# Patient Record
Sex: MALE | Race: WHITE | NOT HISPANIC OR LATINO | Employment: UNEMPLOYED | URBAN - METROPOLITAN AREA
[De-identification: names, ages, dates, MRNs, and addresses within clinical notes are randomized per-mention and may not be internally consistent; named-entity substitution may affect disease eponyms.]

---

## 2018-01-09 NOTE — PROGRESS NOTES
Assessment    1  Dislocation of proximal interphalangeal joint of finger, initial encounter (834 02)   (L44 740D)    Plan  Dislocation of proximal interphalangeal joint of finger, initial encounter    · Weight bearing as tolerated ; Status:Complete;   Done: [id number]   · Follow Up After Surgery Evaluation and Treatment  Follow-up  Status: Hold For -  Scheduling  Requested for: [id number]   · Schedule Surgery Treatment  Procedure  Status: Hold For - Scheduling  Requested for:  [id number]    Discussion/Summary    We have scheduled the patient for closed vs open reduction of the right small PIP joint  We will get this scheduled at his convenience  We did discuss that we may have to pin the PIP joint, and that he will most likely have stiffness of this finger, most likely permanently  We will see him back as an outpatient after surgery  Chief Complaint  Right small finger PIP dislocation      History of Present Illness  HPI: 44year old male presents for evaluation of his right small finger  He has had multiple episodes of "passing out" recently which are being worked up by his primary care provider  Two months ago, he passed out and woke with right small finger pain at the PIP joint  He did not seek immediate treatment  On 12/30/2015, he had an xray obtained which demonstrated a dorsal dislocation of the PIP joint of the right small finger  He presents today for evaluation  Active Problems    1  Calf pain, left (729 5) (M79 662)   2  Closed nondisplaced fracture of lateral malleolus of left fibula, initial encounter (824 2)   (S82 65XA)   3  Left foot pain (729 5) (K80 401)    Family History    · Family history of arthritis (V17 7) (Z82 61)    Social History    · Former smoker (V15 82) (Y19 049)    Current Meds   1  No Reported Medications Recorded    Allergies    1  No Known Drug Allergies    Physical Exam       Right Basic: (Right small finger: tenderness to palpation at the PIP joint   Limited range of motion of the PIP joint  Finger shortened as compared to the contralateral side  Sensation intact except for volar ulnar aspect which had previous crush injury  Minor hook nail deformity due to old crush injury)       Results/Data  I personally reviewed the films/images/results in the office today  My interpretation follows  X-ray Review Plain films of the right hand reveal right small finger PIP dislocation  Attending Note  Attending Note [facility]: Attending Note: I interviewed, took the history and examined the patient and I agree with the Resident management plan as it was presented to me  Level of Participation: I was present in clinic and examined the patient  Comments/Additional Findings: Dimitri Franco has a chronic dorsal PIP joint dislocation  Given the chronicity I recommended surgical reduction  This will likely require an over reduction and possibly pinning of the joint  I explained the risks and benefits of surgery including stiffness  I agree with the Resident's note  Future Appointments    Date/Time Provider Specialty Site   02/08/2016 08:00 AM ELIZABETH Smith  [address] [place] [address] OR   02/22/2016 03:50 PM ELIZABETH Smith   Orthopedic [address] [place] [address]     Signatures   Electronically signed by : ELIZABETH Mejia ; Jan 25 2016  4:13PM EST                       (Author)    Electronically signed by : ELIZABETH Andre ; Jan 25 2016  4:57PM EST                       (Author)

## 2018-01-11 NOTE — CONSULTS
Chief Complaint  Chief Complaint Free Text Note Form: Right small finger PIP dislocation      History of Present Illness  HPI: 44year old male presents for evaluation of his right small finger  He has had multiple episodes of "passing out" recently which are being worked up by his primary care provider  Two months ago, he passed out and woke with right small finger pain at the PIP joint  He did not seek immediate treatment  On 12/30/2015, he had an xray obtained which demonstrated a dorsal dislocation of the PIP joint of the right small finger  He presents today for evaluation  Active Problems    1  Calf pain, left (729 5) (M79 662)   2  Closed nondisplaced fracture of lateral malleolus of left fibula, initial encounter (824 2)   (S82 65XA)   3  Left foot pain (729 5) (S37 156)    Family History    1  Family history of arthritis (V17 7) (Z82 61)    Social History    · Former smoker (V08 31) (N71 884)    Current Meds   1  No Reported Medications Recorded    Allergies    1  No Known Drug Allergies    Physical Exam       Right Basic: (Right small finger: tenderness to palpation at the PIP joint  Limited range of motion of the PIP joint  Finger shortened as compared to the contralateral side  Sensation intact except for volar ulnar aspect which had previous crush injury  Minor hook nail deformity due to old crush injury)       Results/Data  Diagnostic Studies Reviewed: I personally reviewed the films/images/results in the office today  My interpretation follows  X-ray Review Plain films of the right hand reveal right small finger PIP dislocation  Assessment    1  Dislocation of proximal interphalangeal joint of finger, initial encounter (834 02)   (D94 176H)    Plan  Dislocation of proximal interphalangeal joint of finger, initial encounter    1  Weight bearing as tolerated ; Status:Complete;   Done: [id number]   2   Follow Up After Surgery Evaluation and Treatment  Follow-up  Status: Hold For - Scheduling  Requested for: 18VXQ5636   3  Schedule Surgery Treatment  Procedure  Status: Hold For - Scheduling  Requested for:   [id number]    Discussion/Summary  Discussion Summary:   We have scheduled the patient for closed vs open reduction of the right small PIP joint  We will get this scheduled at his convenience  We did discuss that we may have to pin the PIP joint, and that he will most likely have stiffness of this finger, most likely permanently  We will see him back as an outpatient after surgery        Future Appointments    Signatures   Electronically signed by : ELIZABETH Whelan ; Jan 25 2016  4:13PM EST                       (Author)    Electronically signed by : ELIZABETH Cha ; Jan 25 2016  4:57PM EST                       (Author)

## 2018-01-11 NOTE — CONSULTS
Chief Complaint  Right small finger PIP dislocation      History of Present Illness  HPI: 44year old male presents for evaluation of his right small finger  He has had multiple episodes of "passing out" recently which are being worked up by his primary care provider  Two months ago, he passed out and woke with right small finger pain at the PIP joint  He did not seek immediate treatment  On 12/30/2015, he had an xray obtained which demonstrated a dorsal dislocation of the PIP joint of the right small finger  He presents today for evaluation  Active Problems    1  Calf pain, left (729 5) (M79 662)   2  Closed nondisplaced fracture of lateral malleolus of left fibula, initial encounter (824 2)   (S82 65XA)   3  Left foot pain (729 5) (V07 842)    Family History    · Family history of arthritis (V17 7) (Z82 61)    Social History    · Former smoker (V15 82) (T51 991)    Current Meds   1  No Reported Medications Recorded    Allergies    1  No Known Drug Allergies    Physical Exam       Right Basic: (Right small finger: tenderness to palpation at the PIP joint  Limited range of motion of the PIP joint  Finger shortened as compared to the contralateral side  Sensation intact except for volar ulnar aspect which had previous crush injury  Minor hook nail deformity due to old crush injury)       Results/Data  I personally reviewed the films/images/results in the office today  My interpretation follows  X-ray Review Plain films of the right hand reveal right small finger PIP dislocation  Assessment    1   Dislocation of proximal interphalangeal joint of finger, initial encounter (834 02)   (J04 754Y)    Plan  Dislocation of proximal interphalangeal joint of finger, initial encounter    · Weight bearing as tolerated ; Status:Complete;   Done: 48KTO6964   · Follow Up After Surgery Evaluation and Treatment  Follow-up  Status: Hold For -  Scheduling  Requested for: 69THP8565   · Schedule Surgery Treatment  Procedure Status: Hold For - Scheduling  Requested for:  [id number]    Discussion/Summary    We have scheduled the patient for closed vs open reduction of the right small PIP joint  We will get this scheduled at his convenience  We did discuss that we may have to pin the PIP joint, and that he will most likely have stiffness of this finger, most likely permanently  We will see him back as an outpatient after surgery        Signatures   Electronically signed by : ELIZABETH Rudd ; Jan 25 2016  4:13PM EST                       (Author)    Electronically signed by : ELIZABETH Pedersen ; Jan 25 2016  4:57PM EST                       (Author)

## 2022-04-05 ENCOUNTER — OFFICE VISIT (OUTPATIENT)
Dept: FAMILY MEDICINE CLINIC | Facility: CLINIC | Age: 57
End: 2022-04-05
Payer: COMMERCIAL

## 2022-04-05 VITALS
WEIGHT: 148 LBS | DIASTOLIC BLOOD PRESSURE: 80 MMHG | TEMPERATURE: 97.8 F | OXYGEN SATURATION: 99 % | BODY MASS INDEX: 21.19 KG/M2 | HEIGHT: 70 IN | HEART RATE: 72 BPM | SYSTOLIC BLOOD PRESSURE: 120 MMHG | RESPIRATION RATE: 16 BRPM

## 2022-04-05 DIAGNOSIS — Z00.00 WELL ADULT EXAM: Primary | ICD-10-CM

## 2022-04-05 DIAGNOSIS — Z13.89 SCREENING FOR CARDIOVASCULAR, RESPIRATORY, AND GENITOURINARY DISEASES: ICD-10-CM

## 2022-04-05 DIAGNOSIS — Z13.29 SCREENING FOR THYROID DISORDER: ICD-10-CM

## 2022-04-05 DIAGNOSIS — Z12.11 SCREENING FOR COLON CANCER: ICD-10-CM

## 2022-04-05 DIAGNOSIS — Z11.59 NEED FOR HEPATITIS C SCREENING TEST: ICD-10-CM

## 2022-04-05 DIAGNOSIS — Z12.5 SCREENING FOR PROSTATE CANCER: ICD-10-CM

## 2022-04-05 DIAGNOSIS — Z11.4 SCREENING FOR HIV (HUMAN IMMUNODEFICIENCY VIRUS): ICD-10-CM

## 2022-04-05 DIAGNOSIS — F10.10 ALCOHOL ABUSE: ICD-10-CM

## 2022-04-05 DIAGNOSIS — Z13.6 SCREENING FOR CARDIOVASCULAR, RESPIRATORY, AND GENITOURINARY DISEASES: ICD-10-CM

## 2022-04-05 DIAGNOSIS — Z23 NEED FOR VACCINATION: ICD-10-CM

## 2022-04-05 DIAGNOSIS — Z13.83 SCREENING FOR CARDIOVASCULAR, RESPIRATORY, AND GENITOURINARY DISEASES: ICD-10-CM

## 2022-04-05 PROCEDURE — 99386 PREV VISIT NEW AGE 40-64: CPT | Performed by: FAMILY MEDICINE

## 2022-04-05 PROCEDURE — 90471 IMMUNIZATION ADMIN: CPT

## 2022-04-05 PROCEDURE — 90715 TDAP VACCINE 7 YRS/> IM: CPT

## 2022-04-05 PROCEDURE — 3725F SCREEN DEPRESSION PERFORMED: CPT | Performed by: FAMILY MEDICINE

## 2022-04-05 NOTE — PROGRESS NOTES
FAMILY PRACTICE HEALTH MAINTENANCE OFFICE VISIT  [facility]    NAME: Catherine Wise  AGE: 60 y o  SEX: male  : 1965     DATE: 2022    Assessment and Plan     1  Well adult exam    2  Need for hepatitis C screening test  -     Hepatitis C Antibody ([facility]); Future  -     Hepatitis C Antibody ([facility])    3  Screening for HIV (human immunodeficiency virus)  -     [facility]- Human Immunodeficiency Virus 1/2 Antigen / Antibody ( Fourth Generation) with Reflex Testing; Future    4  Screening for cardiovascular, respiratory, and genitourinary diseases  -     CBC and differential; Future  -     Comprehensive metabolic panel; Future  -     Lipid Panel with Direct LDL reflex; Future  -     CBC and differential  -     Comprehensive metabolic panel  -     Lipid Panel with Direct LDL reflex    5  Screening for thyroid disorder  -     TSH, 3rd generation with Free T4 reflex; Future  -     TSH, 3rd generation with Free T4 reflex    6  Screening for prostate cancer  -     PSA, Total Screen; Future    7  Screening for colon cancer  -     Ambulatory referral for colonoscopy; Future    8  Need for vaccination  -     TDAP VACCINE GREATER THAN OR EQUAL TO 6YO IM    9  Alcohol abuse  Comments: On average drinks a 6 pack of beer and a couple of shots daily  Not ready to cut down  Counseled on cessation  · Patient Counseling:   · Nutrition: Stressed importance of a well balanced diet, moderation of sodium/saturated fat, caloric balance and sufficient intake of fiber  · Exercise: Stressed the importance of regular exercise with a goal of 150 minutes per week  · Dental Health: Discussed daily flossing and brushing and regular dental visits   · Immunizations reviewed: See Orders  · Discussed benefits of:  Colon Cancer Screening, Prostate Cancer Screening  and Screening labs   BMI Counseling: Body mass index is 21 54 kg/m²   Discussed with patient's BMI with him  The BMI is normal      No follow-ups on file  Chief Complaint     Chief Complaint   Patient presents with    Physical Exam     [facility] Patient Visit    Establish Care       History of Present Illness     HPI    Well Adult Physical   Patient here for a comprehensive physical exam       Diet and Physical Activity  Diet: well balanced diet  Exercise: never      Depression Screen  PHQ-2/9 Depression Screening    Little interest or pleasure in doing things: 0 - not at all  Feeling down, depressed, or hopeless: 0 - not at all  PHQ-2 Score: 0  PHQ-2 Interpretation: Negative depression screen          General Health  Hearing: Normal:  bilateral  Vision: no vision problems, most recent eye exam >1 year and wears glasses  Dental: no dental visits for >1 year, brushes teeth once daily and does not floss    Reproductive Health  No issues       The following portions of the patient's history were reviewed and updated as appropriate: allergies, current medications, past family history, past medical history, past social history, past surgical history and problem list     Review of Systems     Review of Systems   Constitutional: Negative  HENT: Negative  Eyes: Negative  Respiratory: Negative  Cardiovascular: Negative  Gastrointestinal: Negative  Endocrine: Negative  Genitourinary: Negative  Musculoskeletal: Negative  Neurological: Negative  Hematological: Negative  Psychiatric/Behavioral: Negative          Past Medical History     Past Medical History:   Diagnosis Date    COPD [facility]chronic obstructive pulmonary disease) ([facility] [address] )     Hx of TIA (transient ischemic attack) and stroke     Seizures ([facility])        Past Surgical History     Past Surgical History:   Procedure Laterality Date    NO PAST SURGERIES         Social History     Social History     Socioeconomic History    Marital status: Single     Spouse name: None    Number of children: None    Years of education: None    Highest education level: None   Occupational History    None   Tobacco Use    Smoking status: Former Smoker     Packs/day: 2 00     Years: 30 00     Pack years: 60 00     Types: Cigarettes    Smokeless tobacco: Never Used   Vaping Use    Vaping Use: Never used   Substance and Sexual Activity    Alcohol use: Yes     Comment: 1-2 x week    Drug use: No    Sexual activity: None   Other Topics Concern    None   Social History Narrative    None     Social Determinants of Health     Financial Resource Strain: Not on file   Food Insecurity: Not on file   Transportation Needs: Not on file   Physical Activity: Not on file   Stress: Not on file   Social Connections: Not on file   Intimate Partner Violence: Not on file   Housing Stability: Not on file       Family History     History reviewed  No pertinent family history  Current Medications     No current outpatient medications on file  Allergies     No Known Allergies    Objective     /80   Pulse 72   Temp 97 8 °F (36 6 °C)   Resp 16   Ht 5' 9 5" (1 765 m)   Wt 67 1 kg (148 lb)   SpO2 99%   BMI 21 54 kg/m²      Physical Exam  Constitutional:       General: He is not in acute distress  Appearance: He is well-developed  He is not diaphoretic  HENT:      Head: Normocephalic and atraumatic  Right Ear: Hearing, tympanic membrane, ear canal and external ear normal       Left Ear: Hearing, tympanic membrane, ear canal and external ear normal       Nose: Nose normal       Mouth/Throat:      Pharynx: No oropharyngeal exudate  Eyes:      General: No scleral icterus  Right eye: No discharge  Left eye: No discharge  Conjunctiva/sclera: Conjunctivae normal       Pupils: Pupils are equal, round, and reactive to light  Neck:      Thyroid: No thyromegaly  Trachea: No tracheal deviation  Cardiovascular:      Rate and Rhythm: Normal rate and regular rhythm  Heart sounds: Normal heart sounds   No murmur heard  No friction rub  No gallop  Pulmonary:      Effort: Pulmonary effort is normal  No respiratory distress  Breath sounds: Normal breath sounds  No wheezing or rales  Chest:      Chest wall: No tenderness  Abdominal:      General: Bowel sounds are normal  There is no distension  Palpations: Abdomen is soft  There is no mass  Tenderness: There is no abdominal tenderness  There is no guarding or rebound  Musculoskeletal:         General: No tenderness or deformity  Normal range of motion  Cervical back: Normal range of motion and neck supple  Skin:     General: Skin is warm and dry  Coloration: Skin is not pale  Findings: No erythema or rash  Neurological:      Mental Status: He is alert and oriented to person, place, and time  Motor: No abnormal muscle tone  Coordination: Coordination normal       Deep Tendon Reflexes: Reflexes normal    Psychiatric:         Behavior: Behavior normal          Thought Content:  Thought content normal          Judgment: Judgment normal             Visual Acuity Screening    Right eye Left eye Both eyes   Without correction:      With correction: 20/25 20/30 20/20           MD EV [facility]

## 2022-04-08 ENCOUNTER — OFFICE VISIT (OUTPATIENT)
Dept: GASTROENTEROLOGY | Facility: CLINIC | Age: 57
End: 2022-04-08
Payer: COMMERCIAL

## 2022-04-08 VITALS
HEIGHT: 70 IN | SYSTOLIC BLOOD PRESSURE: 130 MMHG | WEIGHT: 154.8 LBS | DIASTOLIC BLOOD PRESSURE: 82 MMHG | BODY MASS INDEX: 22.16 KG/M2 | TEMPERATURE: 98.7 F | HEART RATE: 81 BPM

## 2022-04-08 DIAGNOSIS — F10.10 ALCOHOL ABUSE: Primary | ICD-10-CM

## 2022-04-08 DIAGNOSIS — R19.8 LIVER PALPABLE: ICD-10-CM

## 2022-04-08 DIAGNOSIS — Z12.11 SCREENING FOR COLON CANCER: ICD-10-CM

## 2022-04-08 PROCEDURE — 1036F TOBACCO NON-USER: CPT | Performed by: PHYSICIAN ASSISTANT

## 2022-04-08 PROCEDURE — 3008F BODY MASS INDEX DOCD: CPT | Performed by: PHYSICIAN ASSISTANT

## 2022-04-08 PROCEDURE — 99204 OFFICE O/P NEW MOD 45 MIN: CPT | Performed by: PHYSICIAN ASSISTANT

## 2022-04-08 RX ORDER — SODIUM PICOSULFATE, MAGNESIUM OXIDE, AND ANHYDROUS CITRIC ACID 10; 3.5; 12 MG/160ML; G/160ML; G/160ML
LIQUID ORAL
Qty: 320 ML | Refills: 0 | Status: SHIPPED | OUTPATIENT
Start: 2022-04-08 | End: 2022-05-03 | Stop reason: ALTCHOICE

## 2022-04-08 NOTE — LETTER
April 12, 2022     Chelo Velez MD  [address] [address]    Patient: Lady Tiwari   YOB: 1965   Date of Visit: 4/8/2022       Dear Dr Delores Lewis:    Thank you for referring Ross Gregorypavan to me for evaluation  Below are my notes for this consultation  If you have questions, please do not hesitate to call me  I look forward to following your patient along with you  Sincerely,        Leidy Pat PA-C        CC: No Recipients  Petr Thornton  4/8/2022  4:24 PM  Attested  [facility] - Outpatient Consultation  Mike Wise 60 y o  male MRN: [id number]  Encounter: [id number]          ASSESSMENT AND PLAN:      1  Screening for colon cancer  No alarm symptoms at this time, rule out underlying adenomatous polyps or malignancy    -will schedule patient for colonoscopy    -procedure was explained in detail to the patient at this time including associated risks and benefits, risks including but not limited to infection, perforation bleeding    -prescription and instructions provided for colonoscopy prep      2   Alcohol abuse with longstanding history of heavy alcohol use, possibly palpable liver on abdominal exam, question for underlying undiagnosed chronic liver disease    - will check right upper quadrant ultrasound, along with CBC, CMP, and PT INR    -if this workup is suggestive of cirrhosis of the liver, then we can plan for EGD at the same time as colonoscopy; in such case would also recommend more extensive serologic workup at that time to exclude other causes of chronic liver disease, and to check AFP level    - I advised strongly about the importance of decreasing his alcohol intake, and about considering rehab, I told him in no uncertain terms that his current pattern of alcohol use this places him at high risk for development of cirrhosis and its associated complications    ______________________________________________________________________    HPI:  75-year-old male with history of alcohol abuse, COPD, TIA who presents for evaluation  Patient said he recently established care with a PCP in the area who recommended him to see us for colon cancer screening  The patient has never had colonoscopy or any form of colon cancer screening before  Denies any known family history of colon cancer  He denies any irregularities to stool habits, any blood or mucus in the stools, any abdominal pain, any nausea or vomiting, any loss of appetite or unexplained weight loss, any difficulty or discomfort with swallowing, any acid reflux or heartburn  The patient denies any known history of liver disease, any history of IV drug use, he says he does use marijuana, he quit smoking cigarettes, he does drink alcohol, estimates that he will drink about a 6 pack of beer and a couple shots of liquor a day, and this has been his pattern of alcohol use for a long time  Has tried to quit couple times in the past does not specific about how long he ever was able to maintain sobriety  I do not see any lab work or liver imaging on file from the patient within the last 5 years  He denies any lower extremity swelling, abdominal swelling, tremors or memory issues  REVIEW OF SYSTEMS:    CONSTITUTIONAL: Denies any fever, chills, rigors, and weight loss  HEENT: No earache or tinnitus  Denies hearing loss or visual disturbances  CARDIOVASCULAR: No chest pain or palpitations  RESPIRATORY: Denies any cough, hemoptysis, shortness of breath or dyspnea on exertion  GASTROINTESTINAL: As noted in the History of Present Illness  GENITOURINARY: No problems with urination  Denies any hematuria or dysuria  NEUROLOGIC: No dizziness or vertigo, denies headaches  MUSCULOSKELETAL: Denies any muscle or joint pain  SKIN: Denies skin rashes or itching     ENDOCRINE: Denies excessive thirst  Denies intolerance to heat or cold  PSYCHOSOCIAL: Denies depression or anxiety  Denies any recent memory loss  Historical Information   Past Medical History:   Diagnosis Date    [facility]ch[facility] disease) ([facility] [address] )     Hx of TIA (transient ischemic attack) and stroke     Seizures ([facility])      Past Surgical History:   Procedure Laterality Date    NO PAST SURGERIES       Social History   Social History     Substance and Sexual Activity   Alcohol Use Yes    Comment: 1-2 x week     Social History     Substance and Sexual Activity   Drug Use No     Social History     Tobacco Use   Smoking Status Former Smoker    Packs/day: 2 00    Years: 30 00    Pack years: 60 00    Types: Cigarettes   Smokeless Tobacco Never Used     History reviewed  No pertinent family history  Meds/Allergies       Current Outpatient Medications:     Sod Picosulfate-Mag Ox-Cit Acd (Clenpiq) 10-3 5-12 MG-GM -GM/160ML SOLN    No Known Allergies        Objective     Blood pressure 130/82, pulse 81, temperature 98 7 °F (37 1 °C), temperature source Temporal, height 5' 9 5" (1 765 m), weight 70 2 kg (154 lb 12 8 oz)  Body mass index is 22 53 kg/m²  PHYSICAL EXAM:      General Appearance:   Alert, cooperative, no distress  Fairly blunted affect, mildly tremulous, oriented x3 and answering questions appropriately, mild difficulty finding words   HEENT:   Normocephalic, atraumatic, anicteric      Neck:  Supple, symmetrical, trachea midline   Lungs:   Clear to auscultation bilaterally; no rales, rhonchi or wheezing; respirations unlabored    Heart[de-identified]   Regular rate and rhythm; no murmur, rub, or gallop     Abdomen:   Soft, non-tender, non-distended; normal bowel sounds; no masses, some firmness to palpation in the upper abdomen and particularly right upper quadrant, possibly palpable enlarged liver    Genitalia:   Deferred    Rectal:   Deferred    Extremities:  No cyanosis, clubbing or edema    Pulses:  2+ and symmetric    Skin:  No jaundice, rashes, or lesions    Lymph nodes:  No palpable cervical lymphadenopathy        Lab Results:   No visits with results within 1 Day(s) from this visit  Latest known visit with results is:   Hospital Outpatient Visit on 11/09/2015   Component Date Value    WBC 11/09/2015 7 0     RBC 11/09/2015 4 36*    Hemoglobin 11/09/2015 14 6     Hematocrit 11/09/2015 42 8     MCV 11/09/2015 98 2     MCH 11/09/2015 33 6*    MCHC 11/09/2015 34 2     RDW 11/09/2015 14 1     Platelets 80/22/2274 229     MPV 11/09/2015 8 1     Neutrophils Relative 11/09/2015 60 1     Lymphocytes Relative 11/09/2015 26 9     Monocytes Relative 11/09/2015 9 2     Eosinophils Relative 11/09/2015 3 0     Basophils Relative 11/09/2015 0 8     Neutrophils Absolute 11/09/2015 4 2     Lymphocytes Absolute 11/09/2015 1 9     Monocytes Absolute 11/09/2015 0 6     Eosinophils Absolute 11/09/2015 0 2     Basophils Absolute 11/09/2015 0 1     Glucose 11/09/2015 109     BUN 11/09/2015 12     Creatinine 11/09/2015 0 9     Sodium 11/09/2015 139     Potassium 11/09/2015 4 0     Chloride 11/09/2015 104     CO2 11/09/2015 29     Total Protein 11/09/2015 7 7     Albumin 11/09/2015 3 7     Calcium 11/09/2015 9 0     Total Bilirubin 11/09/2015 0 6     ALT 11/09/2015 43     AST 11/09/2015 25     Alkaline Phosphatase 11/09/2015 61     Anion Gap 11/09/2015 10 2     A/G RATIO 11/09/2015 0 90*    BUN/CREA Ratio 11/09/2015 13 6     Total CK 11/09/2015 105     Troponin I 11/09/2015 <0 02     eGFR  11/09/2015 >60     EGFR-AMERICAN CALC 11/09/2015 >60          Radiology Results:   No results found  Attestation signed by Christian Troncoso MD at 4/12/2022  7:42 AM:  Patient was evaluated independently by the PA however agree with recommendations

## 2022-04-08 NOTE — PROGRESS NOTES
[facility] - Outpatient Consultation  Audelia Wise 60 y o  male MRN: [id number]  Encounter: [id number]          ASSESSMENT AND PLAN:      1  Screening for colon cancer  No alarm symptoms at this time, rule out underlying adenomatous polyps or malignancy    -will schedule patient for colonoscopy    -procedure was explained in detail to the patient at this time including associated risks and benefits, risks including but not limited to infection, perforation bleeding    -prescription and instructions provided for colonoscopy prep      2  Alcohol abuse with longstanding history of heavy alcohol use, possibly palpable liver on abdominal exam, question for underlying undiagnosed chronic liver disease    - will check right upper quadrant ultrasound, along with CBC, CMP, and PT INR    -if this workup is suggestive of cirrhosis of the liver, then we can plan for EGD at the same time as colonoscopy; in such case would also recommend more extensive serologic workup at that time to exclude other causes of chronic liver disease, and to check AFP level    - I advised strongly about the importance of decreasing his alcohol intake, and about considering rehab, I told him in no uncertain terms that his current pattern of alcohol use this places him at high risk for development of cirrhosis and its associated complications    ______________________________________________________________________    HPI:  57-year-old male with history of alcohol abuse, COPD, TIA who presents for evaluation  Patient said he recently established care with a PCP in the area who recommended him to see us for colon cancer screening  The patient has never had colonoscopy or any form of colon cancer screening before  Denies any known family history of colon cancer    He denies any irregularities to stool habits, any blood or mucus in the stools, any abdominal pain, any nausea or vomiting, any loss of appetite or unexplained weight loss, any difficulty or discomfort with swallowing, any acid reflux or heartburn  The patient denies any known history of liver disease, any history of IV drug use, he says he does use marijuana, he quit smoking cigarettes, he does drink alcohol, estimates that he will drink about a 6 pack of beer and a couple shots of liquor a day, and this has been his pattern of alcohol use for a long time  Has tried to quit couple times in the past does not specific about how long he ever was able to maintain sobriety  I do not see any lab work or liver imaging on file from the patient within the last 5 years  He denies any lower extremity swelling, abdominal swelling, tremors or memory issues  REVIEW OF SYSTEMS:    CONSTITUTIONAL: Denies any fever, chills, rigors, and weight loss  HEENT: No earache or tinnitus  Denies hearing loss or visual disturbances  CARDIOVASCULAR: No chest pain or palpitations  RESPIRATORY: Denies any cough, hemoptysis, shortness of breath or dyspnea on exertion  GASTROINTESTINAL: As noted in the History of Present Illness  GENITOURINARY: No problems with urination  Denies any hematuria or dysuria  NEUROLOGIC: No dizziness or vertigo, denies headaches  MUSCULOSKELETAL: Denies any muscle or joint pain  SKIN: Denies skin rashes or itching  ENDOCRINE: Denies excessive thirst  Denies intolerance to heat or cold  PSYCHOSOCIAL: Denies depression or anxiety  Denies any recent memory loss         Historical Information   Past Medical History:   Diagnosis Date    COPD [facility]chronic obstructive pulmonary disease) ([facility] [address] )     Hx of TIA (transient ischemic attack) and stroke     Seizures (HCC)      Past Surgical History:   Procedure Laterality Date    NO PAST SURGERIES       Social History   Social History     Substance and Sexual Activity   Alcohol Use Yes    Comment: 1-2 x week     Social History     Substance and Sexual Activity   Drug Use No     Social History     Tobacco Use   Smoking Status Former Smoker    Packs/day: 2 00    Years: 30 00    Pack years: 60 00    Types: Cigarettes   Smokeless Tobacco Never Used     History reviewed  No pertinent family history  Meds/Allergies       Current Outpatient Medications:     Sod Picosulfate-Mag Ox-Cit Acd (Clenpiq) 10-3 5-12 MG-GM -GM/160ML SOLN    No Known Allergies        Objective     Blood pressure 130/82, pulse 81, temperature 98 7 °F (37 1 °C), temperature source Temporal, height 5' 9 5" (1 765 m), weight 70 2 kg (154 lb 12 8 oz)  Body mass index is 22 53 kg/m²  PHYSICAL EXAM:      General Appearance:   Alert, cooperative, no distress  Fairly blunted affect, mildly tremulous, oriented x3 and answering questions appropriately, mild difficulty finding words   HEENT:   Normocephalic, atraumatic, anicteric      Neck:  Supple, symmetrical, trachea midline   Lungs:   Clear to auscultation bilaterally; no rales, rhonchi or wheezing; respirations unlabored    Heart[de-identified]   Regular rate and rhythm; no murmur, rub, or gallop  Abdomen:   Soft, non-tender, non-distended; normal bowel sounds; no masses, some firmness to palpation in the upper abdomen and particularly right upper quadrant, possibly palpable enlarged liver    Genitalia:   Deferred    Rectal:   Deferred    Extremities:  No cyanosis, clubbing or edema    Pulses:  2+ and symmetric    Skin:  No jaundice, rashes, or lesions    Lymph nodes:  No palpable cervical lymphadenopathy        Lab Results:   No visits with results within 1 Day(s) from this visit     Latest known visit with results is:   Hospital Outpatient Visit on 11/09/2015   Component Date Value    WBC 11/09/2015 7 0     RBC 11/09/2015 4 36*    Hemoglobin 11/09/2015 14 6     Hematocrit 11/09/2015 42 8     MCV 11/09/2015 98 2     MCH 11/09/2015 33 6*    MCHC 11/09/2015 34 2     RDW 11/09/2015 14 1     Platelets 75/28/8730 229     MPV 11/09/2015 8 1     Neutrophils Relative 11/09/2015 60 1     Lymphocytes Relative 11/09/2015 26 9     Monocytes Relative 11/09/2015 9 2     Eosinophils Relative 11/09/2015 3 0     Basophils Relative 11/09/2015 0 8     Neutrophils Absolute 11/09/2015 4 2     Lymphocytes Absolute 11/09/2015 1 9     Monocytes Absolute 11/09/2015 0 6     Eosinophils Absolute 11/09/2015 0 2     Basophils Absolute 11/09/2015 0 1     Glucose 11/09/2015 109     BUN 11/09/2015 12     Creatinine 11/09/2015 0 9     Sodium 11/09/2015 139     Potassium 11/09/2015 4 0     Chloride 11/09/2015 104     CO2 11/09/2015 29     Total Protein 11/09/2015 7 7     Albumin 11/09/2015 3 7     Calcium 11/09/2015 9 0     Total Bilirubin 11/09/2015 0 6     ALT 11/09/2015 43     AST 11/09/2015 25     Alkaline Phosphatase 11/09/2015 61     Anion Gap 11/09/2015 10 2     A/G RATIO 11/09/2015 0 90*    BUN/CREA Ratio 11/09/2015 13 6     Total CK 11/09/2015 105     Troponin I 11/09/2015 <0 02     eGFR  11/09/2015 >60     EGFR-AMERICAN CALC 11/09/2015 >60          Radiology Results:   No results found

## 2022-04-08 NOTE — H&P (VIEW-ONLY)
[facility] - Outpatient Consultation  Mariel Wise 60 y o  male MRN: [id number]  Encounter: [id number]          ASSESSMENT AND PLAN:      1  Screening for colon cancer  No alarm symptoms at this time, rule out underlying adenomatous polyps or malignancy    -will schedule patient for colonoscopy    -procedure was explained in detail to the patient at this time including associated risks and benefits, risks including but not limited to infection, perforation bleeding    -prescription and instructions provided for colonoscopy prep      2  Alcohol abuse with longstanding history of heavy alcohol use, possibly palpable liver on abdominal exam, question for underlying undiagnosed chronic liver disease    - will check right upper quadrant ultrasound, along with CBC, CMP, and PT INR    -if this workup is suggestive of cirrhosis of the liver, then we can plan for EGD at the same time as colonoscopy; in such case would also recommend more extensive serologic workup at that time to exclude other causes of chronic liver disease, and to check AFP level    - I advised strongly about the importance of decreasing his alcohol intake, and about considering rehab, I told him in no uncertain terms that his current pattern of alcohol use this places him at high risk for development of cirrhosis and its associated complications    ______________________________________________________________________    HPI:  68-year-old male with history of alcohol abuse, COPD, TIA who presents for evaluation  Patient said he recently established care with a PCP in the area who recommended him to see us for colon cancer screening  The patient has never had colonoscopy or any form of colon cancer screening before  Denies any known family history of colon cancer    He denies any irregularities to stool habits, any blood or mucus in the stools, any abdominal pain, any nausea or vomiting, any loss of appetite or unexplained weight loss, any difficulty or discomfort with swallowing, any acid reflux or heartburn  The patient denies any known history of liver disease, any history of IV drug use, he says he does use marijuana, he quit smoking cigarettes, he does drink alcohol, estimates that he will drink about a 6 pack of beer and a couple shots of liquor a day, and this has been his pattern of alcohol use for a long time  Has tried to quit couple times in the past does not specific about how long he ever was able to maintain sobriety  I do not see any lab work or liver imaging on file from the patient within the last 5 years  He denies any lower extremity swelling, abdominal swelling, tremors or memory issues  REVIEW OF SYSTEMS:    CONSTITUTIONAL: Denies any fever, chills, rigors, and weight loss  HEENT: No earache or tinnitus  Denies hearing loss or visual disturbances  CARDIOVASCULAR: No chest pain or palpitations  RESPIRATORY: Denies any cough, hemoptysis, shortness of breath or dyspnea on exertion  GASTROINTESTINAL: As noted in the History of Present Illness  GENITOURINARY: No problems with urination  Denies any hematuria or dysuria  NEUROLOGIC: No dizziness or vertigo, denies headaches  MUSCULOSKELETAL: Denies any muscle or joint pain  SKIN: Denies skin rashes or itching  ENDOCRINE: Denies excessive thirst  Denies intolerance to heat or cold  PSYCHOSOCIAL: Denies depression or anxiety  Denies any recent memory loss         Historical Information   Past Medical History:   Diagnosis Date    COPD [facility]chronic obstructive pulmonary disease) ([facility] [address] )     Hx of TIA (transient ischemic attack) and stroke     Seizures (HCC)      Past Surgical History:   Procedure Laterality Date    NO PAST SURGERIES       Social History   Social History     Substance and Sexual Activity   Alcohol Use Yes    Comment: 1-2 x week     Social History     Substance and Sexual Activity   Drug Use No     Social History     Tobacco Use   Smoking Status Former Smoker    Packs/day: 2 00    Years: 30 00    Pack years: 60 00    Types: Cigarettes   Smokeless Tobacco Never Used     History reviewed  No pertinent family history  Meds/Allergies       Current Outpatient Medications:     Sod Picosulfate-Mag Ox-Cit Acd (Clenpiq) 10-3 5-12 MG-GM -GM/160ML SOLN    No Known Allergies        Objective     Blood pressure 130/82, pulse 81, temperature 98 7 °F (37 1 °C), temperature source Temporal, height 5' 9 5" (1 765 m), weight 70 2 kg (154 lb 12 8 oz)  Body mass index is 22 53 kg/m²  PHYSICAL EXAM:      General Appearance:   Alert, cooperative, no distress  Fairly blunted affect, mildly tremulous, oriented x3 and answering questions appropriately, mild difficulty finding words   HEENT:   Normocephalic, atraumatic, anicteric      Neck:  Supple, symmetrical, trachea midline   Lungs:   Clear to auscultation bilaterally; no rales, rhonchi or wheezing; respirations unlabored    Heart[de-identified]   Regular rate and rhythm; no murmur, rub, or gallop  Abdomen:   Soft, non-tender, non-distended; normal bowel sounds; no masses, some firmness to palpation in the upper abdomen and particularly right upper quadrant, possibly palpable enlarged liver    Genitalia:   Deferred    Rectal:   Deferred    Extremities:  No cyanosis, clubbing or edema    Pulses:  2+ and symmetric    Skin:  No jaundice, rashes, or lesions    Lymph nodes:  No palpable cervical lymphadenopathy        Lab Results:   No visits with results within 1 Day(s) from this visit     Latest known visit with results is:   Hospital Outpatient Visit on 11/09/2015   Component Date Value    WBC 11/09/2015 7 0     RBC 11/09/2015 4 36*    Hemoglobin 11/09/2015 14 6     Hematocrit 11/09/2015 42 8     MCV 11/09/2015 98 2     MCH 11/09/2015 33 6*    MCHC 11/09/2015 34 2     RDW 11/09/2015 14 1     Platelets 73/92/5282 229     MPV 11/09/2015 8 1     Neutrophils Relative 11/09/2015 60 1     Lymphocytes Relative 11/09/2015 26 9     Monocytes Relative 11/09/2015 9 2     Eosinophils Relative 11/09/2015 3 0     Basophils Relative 11/09/2015 0 8     Neutrophils Absolute 11/09/2015 4 2     Lymphocytes Absolute 11/09/2015 1 9     Monocytes Absolute 11/09/2015 0 6     Eosinophils Absolute 11/09/2015 0 2     Basophils Absolute 11/09/2015 0 1     Glucose 11/09/2015 109     BUN 11/09/2015 12     Creatinine 11/09/2015 0 9     Sodium 11/09/2015 139     Potassium 11/09/2015 4 0     Chloride 11/09/2015 104     CO2 11/09/2015 29     Total Protein 11/09/2015 7 7     Albumin 11/09/2015 3 7     Calcium 11/09/2015 9 0     Total Bilirubin 11/09/2015 0 6     ALT 11/09/2015 43     AST 11/09/2015 25     Alkaline Phosphatase 11/09/2015 61     Anion Gap 11/09/2015 10 2     A/G RATIO 11/09/2015 0 90*    BUN/CREA Ratio 11/09/2015 13 6     Total CK 11/09/2015 105     Troponin I 11/09/2015 <0 02     eGFR  11/09/2015 >60     EGFR-AMERICAN CALC 11/09/2015 >60          Radiology Results:   No results found

## 2022-04-11 NOTE — PATIENT INSTRUCTIONS
Scheduled date of colonoscopy (as of today): 04/29/22  Physician performing colonoscopy: J Carlos Lynch  Location of colonoscopy: [facility]  Bowel prep reviewed with patient: CLENPIQ  Instructions reviewed with patient by: VIRGINIA  Clearances: LUCINDA

## 2022-04-20 ENCOUNTER — HOSPITAL ENCOUNTER (OUTPATIENT)
Dept: RADIOLOGY | Facility: HOSPITAL | Age: 57
Discharge: HOME | End: 2022-04-20
Payer: COMMERCIAL

## 2022-04-20 DIAGNOSIS — F10.10 ALCOHOL ABUSE: ICD-10-CM

## 2022-04-20 DIAGNOSIS — Z12.11 SCREENING FOR COLON CANCER: ICD-10-CM

## 2022-04-20 DIAGNOSIS — R19.8 LIVER PALPABLE: ICD-10-CM

## 2022-04-20 PROCEDURE — 76705 ECHO EXAM OF ABDOMEN: CPT

## 2022-04-27 ENCOUNTER — APPOINTMENT (OUTPATIENT)
Dept: LAB | Facility: CLINIC | Age: 57
End: 2022-04-27
Payer: COMMERCIAL

## 2022-04-27 DIAGNOSIS — Z12.5 SCREENING FOR PROSTATE CANCER: ICD-10-CM

## 2022-04-27 DIAGNOSIS — R19.8 LIVER PALPABLE: ICD-10-CM

## 2022-04-27 DIAGNOSIS — F10.10 ALCOHOL ABUSE: ICD-10-CM

## 2022-04-27 DIAGNOSIS — Z11.4 SCREENING FOR HIV (HUMAN IMMUNODEFICIENCY VIRUS): ICD-10-CM

## 2022-04-27 DIAGNOSIS — Z12.11 SCREENING FOR COLON CANCER: ICD-10-CM

## 2022-04-27 LAB
ALBUMIN SERPL BCP-MCNC: 3.7 G/DL (ref 3.5–5)
ALP SERPL-CCNC: 62 U/L (ref 46–116)
ALT SERPL W P-5'-P-CCNC: 20 U/L (ref 12–78)
ANION GAP SERPL CALCULATED.3IONS-SCNC: 3 MMOL/L (ref 4–13)
AST SERPL W P-5'-P-CCNC: 21 U/L (ref 5–45)
BASOPHILS # BLD AUTO: 0.08 THOUSANDS/ΜL (ref 0–0.1)
BASOPHILS NFR BLD AUTO: 1 % (ref 0–1)
BILIRUB SERPL-MCNC: 0.37 MG/DL (ref 0.2–1)
BUN SERPL-MCNC: 12 MG/DL (ref 5–25)
CALCIUM SERPL-MCNC: 8.8 MG/DL (ref 8.3–10.1)
CHLORIDE SERPL-SCNC: 111 MMOL/L (ref 100–108)
CHOLEST SERPL-MCNC: 165 MG/DL
CO2 SERPL-SCNC: 27 MMOL/L (ref 21–32)
CREAT SERPL-MCNC: 0.76 MG/DL (ref 0.6–1.3)
EOSINOPHIL # BLD AUTO: 0.22 THOUSAND/ΜL (ref 0–0.61)
EOSINOPHIL NFR BLD AUTO: 3 % (ref 0–6)
ERYTHROCYTE [DISTWIDTH] IN BLOOD BY AUTOMATED COUNT: 13.8 % (ref 11.6–15.1)
GFR SERPL CREATININE-BSD FRML MDRD: 100 ML/MIN/1.73SQ M
GLUCOSE P FAST SERPL-MCNC: 92 MG/DL (ref 65–99)
HCT VFR BLD AUTO: 45.9 % (ref 36.5–49.3)
HCV AB SER QL: NORMAL
HDLC SERPL-MCNC: 38 MG/DL
HGB BLD-MCNC: 15.2 G/DL (ref 12–17)
IMM GRANULOCYTES # BLD AUTO: 0.01 THOUSAND/UL (ref 0–0.2)
IMM GRANULOCYTES NFR BLD AUTO: 0 % (ref 0–2)
INR PPP: 1.01 (ref 0.84–1.19)
LDLC SERPL CALC-MCNC: 79 MG/DL (ref 0–100)
LYMPHOCYTES # BLD AUTO: 2.28 THOUSANDS/ΜL (ref 0.6–4.47)
LYMPHOCYTES NFR BLD AUTO: 36 % (ref 14–44)
MCH RBC QN AUTO: 32.9 PG (ref 26.8–34.3)
MCHC RBC AUTO-ENTMCNC: 33.1 G/DL (ref 31.4–37.4)
MCV RBC AUTO: 99 FL (ref 82–98)
MONOCYTES # BLD AUTO: 0.5 THOUSAND/ΜL (ref 0.17–1.22)
MONOCYTES NFR BLD AUTO: 8 % (ref 4–12)
NEUTROPHILS # BLD AUTO: 3.31 THOUSANDS/ΜL (ref 1.85–7.62)
NEUTS SEG NFR BLD AUTO: 52 % (ref 43–75)
NRBC BLD AUTO-RTO: 0 /100 WBCS
PLATELET # BLD AUTO: 271 THOUSANDS/UL (ref 149–390)
PMV BLD AUTO: 10.2 FL (ref 8.9–12.7)
POTASSIUM SERPL-SCNC: 4.3 MMOL/L (ref 3.5–5.3)
PROT SERPL-MCNC: 7.5 G/DL (ref 6.4–8.2)
PROTHROMBIN TIME: 12.9 SECONDS (ref 11.6–14.5)
PSA SERPL-MCNC: 0.4 NG/ML (ref 0–4)
RBC # BLD AUTO: 4.62 MILLION/UL (ref 3.88–5.62)
SODIUM SERPL-SCNC: 141 MMOL/L (ref 136–145)
TRIGL SERPL-MCNC: 238 MG/DL
TSH SERPL DL<=0.05 MIU/L-ACNC: 1.26 UIU/ML (ref 0.45–4.5)
WBC # BLD AUTO: 6.4 THOUSAND/UL (ref 4.31–10.16)

## 2022-04-27 PROCEDURE — 36415 COLL VENOUS BLD VENIPUNCTURE: CPT

## 2022-04-27 PROCEDURE — 84443 ASSAY THYROID STIM HORMONE: CPT

## 2022-04-27 PROCEDURE — 85610 PROTHROMBIN TIME: CPT

## 2022-04-27 PROCEDURE — 80053 COMPREHEN METABOLIC PANEL: CPT

## 2022-04-27 PROCEDURE — G0103 PSA SCREENING: HCPCS

## 2022-04-27 PROCEDURE — 87389 HIV-1 AG W/HIV-1&-2 AB AG IA: CPT

## 2022-04-27 PROCEDURE — 80061 LIPID PANEL: CPT

## 2022-04-27 PROCEDURE — 85025 COMPLETE CBC W/AUTO DIFF WBC: CPT

## 2022-04-27 PROCEDURE — 86803 HEPATITIS C AB TEST: CPT

## 2022-04-28 ENCOUNTER — NURSE TRIAGE (OUTPATIENT)
Dept: OTHER | Facility: OTHER | Age: 57
End: 2022-04-28

## 2022-04-28 LAB — HIV 1+2 AB+HIV1 P24 AG SERPL QL IA: NORMAL

## 2022-04-29 ENCOUNTER — ANESTHESIA EVENT (OUTPATIENT)
Dept: GASTROENTEROLOGY | Facility: AMBULARY SURGERY CENTER | Age: 57
End: 2022-04-29

## 2022-04-29 ENCOUNTER — ANESTHESIA (OUTPATIENT)
Dept: GASTROENTEROLOGY | Facility: AMBULARY SURGERY CENTER | Age: 57
End: 2022-04-29

## 2022-04-29 ENCOUNTER — HOSPITAL ENCOUNTER (OUTPATIENT)
Dept: GASTROENTEROLOGY | Facility: AMBULARY SURGERY CENTER | Age: 57
Setting detail: OUTPATIENT SURGERY
Discharge: HOME | End: 2022-04-29
Attending: INTERNAL MEDICINE
Payer: COMMERCIAL

## 2022-04-29 VITALS
BODY MASS INDEX: 21.83 KG/M2 | DIASTOLIC BLOOD PRESSURE: 83 MMHG | HEART RATE: 51 BPM | SYSTOLIC BLOOD PRESSURE: 121 MMHG | TEMPERATURE: 96.4 F | WEIGHT: 150 LBS | RESPIRATION RATE: 18 BRPM | OXYGEN SATURATION: 96 %

## 2022-04-29 DIAGNOSIS — K64.8 INTERNAL HEMORRHOIDS: Primary | ICD-10-CM

## 2022-04-29 DIAGNOSIS — Z12.11 SCREENING FOR COLON CANCER: ICD-10-CM

## 2022-04-29 PROBLEM — G45.9 TIA (TRANSIENT ISCHEMIC ATTACK): Status: ACTIVE | Noted: 2022-04-29

## 2022-04-29 PROBLEM — R56.9 SEIZURES (HCC): Status: ACTIVE | Noted: 2022-04-29

## 2022-04-29 PROBLEM — J44.9 CHRONIC OBSTRUCTIVE PULMONARY DISEASE (HCC): Status: ACTIVE | Noted: 2022-04-29

## 2022-04-29 PROCEDURE — G0121 COLON CA SCRN NOT HI RSK IND: HCPCS | Performed by: INTERNAL MEDICINE

## 2022-04-29 RX ORDER — SODIUM CHLORIDE, SODIUM LACTATE, POTASSIUM CHLORIDE, CALCIUM CHLORIDE 600; 310; 30; 20 MG/100ML; MG/100ML; MG/100ML; MG/100ML
INJECTION, SOLUTION INTRAVENOUS CONTINUOUS PRN
Status: DISCONTINUED | OUTPATIENT
Start: 2022-04-29 | End: 2022-04-29

## 2022-04-29 RX ORDER — PROPOFOL 10 MG/ML
INJECTION, EMULSION INTRAVENOUS AS NEEDED
Status: DISCONTINUED | OUTPATIENT
Start: 2022-04-29 | End: 2022-04-29

## 2022-04-29 RX ORDER — SODIUM CHLORIDE, SODIUM LACTATE, POTASSIUM CHLORIDE, CALCIUM CHLORIDE 600; 310; 30; 20 MG/100ML; MG/100ML; MG/100ML; MG/100ML
75 INJECTION, SOLUTION INTRAVENOUS CONTINUOUS
Status: DISCONTINUED | OUTPATIENT
Start: 2022-04-29 | End: 2022-05-03 | Stop reason: HOSPADM

## 2022-04-29 RX ORDER — HYDROCORTISONE ACETATE 25 MG/1
25 SUPPOSITORY RECTAL 2 TIMES DAILY PRN
Qty: 12 SUPPOSITORY | Refills: 1 | Status: SHIPPED | OUTPATIENT
Start: 2022-04-29 | End: 2022-07-14 | Stop reason: ALTCHOICE

## 2022-04-29 RX ADMIN — SODIUM CHLORIDE, SODIUM LACTATE, POTASSIUM CHLORIDE, AND CALCIUM CHLORIDE: .6; .31; .03; .02 INJECTION, SOLUTION INTRAVENOUS at 13:45

## 2022-04-29 RX ADMIN — PROPOFOL 50 MG: 10 INJECTION, EMULSION INTRAVENOUS at 13:56

## 2022-04-29 RX ADMIN — PROPOFOL 150 MG: 10 INJECTION, EMULSION INTRAVENOUS at 13:52

## 2022-04-29 RX ADMIN — SODIUM CHLORIDE, SODIUM LACTATE, POTASSIUM CHLORIDE, AND CALCIUM CHLORIDE 75 ML/HR: .6; .31; .03; .02 INJECTION, SOLUTION INTRAVENOUS at 11:41

## 2022-04-29 RX ADMIN — PROPOFOL 100 MG: 10 INJECTION, EMULSION INTRAVENOUS at 13:54

## 2022-04-29 NOTE — ANESTHESIA PREPROCEDURE EVALUATION
Procedure:  COLONOSCOPY    Relevant Problems   NEURO/PSYCH   (+) Seizures (HCC)   (+) TIA (transient ischemic attack)      PULMONARY   (+) Chronic obstructive pulmonary disease (HCC)      Other   (+) Alcohol abuse        Physical Exam    Airway    Mallampati score: II  TM Distance: >3 FB  Neck ROM: full     Dental       Cardiovascular  Rhythm: regular, Rate: normal,     Pulmonary  Breath sounds clear to auscultation,     Other Findings        Anesthesia Plan  ASA Score- 2     Anesthesia Type- IV sedation with anesthesia with ASA Monitors  Additional Monitors:   Airway Plan:     Comment: Black coffee at 10:50 am ok for procedure at 11:50  Plan Factors-    Chart reviewed  Patient is not a current smoker  Induction- intravenous  Postoperative Plan-     Informed Consent- Anesthetic plan and risks discussed with patient  I personally reviewed this patient with the CRNA  Discussed and agreed on the Anesthesia Plan with the CRNA  Kylah Lombardo

## 2022-04-29 NOTE — DISCHARGE INSTRUCTIONS
Colonoscopy   WHAT YOU NEED TO KNOW:   A colonoscopy is a procedure to examine the inside of your colon (intestine) with a scope  Polyps or tissue growths may have been removed during your colonoscopy  It is normal to feel bloated and to have some abdominal discomfort  You should be passing gas  If you have hemorrhoids or you had polyps removed, you may have a small amount of bleeding  DISCHARGE INSTRUCTIONS:   Seek care immediately if:   · You have a large amount of bright red blood in your bowel movements  · Your abdomen is hard and firm and you have severe pain  · You have sudden trouble breathing  Call your doctor if:   · You develop a rash or hives  · You have a fever within 24 hours of your procedure  · You have nausea and vomiting  · You feel anesthesia effects greater than 24 hours  · You have not had a bowel movement for 3 days after your procedure  · You have questions or concerns about your condition or care  After your colonoscopy:   · Do not lift, strain, or run  until your healthcare provider says it is okay  · Rest as much as possible  You have been given medicine to relax you  Do not  drive or make important decisions for at least 24 hours  Return to your normal activity as directed  · Relieve gas and discomfort from bloating  by lying on your left side with a heating pad on your abdomen  You may need to take short walks to help the gas move out  Eat small meals until bloating is relieved  If you had polyps removed: For 7 days after your procedure:  · Do not  take aspirin  · Do not  go on long car rides  Help prevent constipation:   · Eat a variety of healthy foods  Healthy foods include fruit, vegetables, whole-grain breads, low-fat dairy products, beans, lean meat, and fish  Ask if you need to be on a special diet  Your healthcare provider may recommend that you eat high-fiber foods such as cooked beans   Fiber helps you have regular bowel movements  · Drink liquids as directed  Adults should drink between 9 and 13 eight-ounce cups of liquid every day  Ask what amount is best for you  For most people, good liquids to drink are water, juice, and milk  · Exercise as directed  Talk to your healthcare provider about the best exercise plan for you  Exercise can help prevent constipation, decrease your blood pressure and improve your health  Follow up with your doctor as directed:  Write down your questions so you remember to ask them during your visits  © Copyright [employer] 2022 Information is for End User's use only and may not be sold, redistributed or otherwise used for commercial purposes  All illustrations and images included in CareNotes® are the copyrighted property of A D A M , Inc  or [facility] Armando Betancourt   The above information is an  only  It is not intended as medical advice for individual conditions or treatments  Talk to your doctor, nurse or pharmacist before following any medical regimen to see if it is safe and effective for you

## 2022-04-29 NOTE — INTERVAL H&P NOTE
H&P reviewed  After examining the patient I find no changes in the patients condition since the H&P had been written      Vitals:    04/29/22 1130   BP: 148/79   Pulse: (!) 50   Resp: 16   Temp: (!) 96 4 °F (35 8 °C)   SpO2: 99%

## 2022-04-29 NOTE — ANESTHESIA POSTPROCEDURE EVALUATION
Post-Op Assessment Note    CV Status:  Stable  Pain Score: 0    Pain management: adequate     Mental Status:  Alert and awake   Hydration Status:  Euvolemic   PONV Controlled:  Controlled   Airway Patency:  Patent      Post Op Vitals Reviewed: Yes      Staff: CRNA, Anesthesiologist   Comments: vss        No complications documented      BP   111/73   Temp     Pulse 81   Resp   16   SpO2   99

## 2022-05-03 ENCOUNTER — TELEPHONE (OUTPATIENT)
Dept: GASTROENTEROLOGY | Facility: AMBULARY SURGERY CENTER | Age: 57
End: 2022-05-03

## 2022-05-03 DIAGNOSIS — Z12.11 SCREENING FOR COLON CANCER: Primary | ICD-10-CM

## 2022-05-03 NOTE — TELEPHONE ENCOUNTER
----- Message from Juliana House MD sent at 4/29/2022  4:27 PM EDT -----  Milderd Melena,  Please reschedule for colonoscopy because of the poor prep  Patient reports that he ate yesterday    Please give him GoLYTELY and Dulcolax prep  Thank you  Dr Grace Hill

## 2022-07-07 ENCOUNTER — HOSPITAL ENCOUNTER (EMERGENCY)
Facility: HOSPITAL | Age: 57
Discharge: HOME | End: 2022-07-07
Attending: EMERGENCY MEDICINE
Payer: COMMERCIAL

## 2022-07-07 ENCOUNTER — APPOINTMENT (EMERGENCY)
Dept: RADIOLOGY | Facility: HOSPITAL | Age: 57
End: 2022-07-07
Payer: COMMERCIAL

## 2022-07-07 VITALS
SYSTOLIC BLOOD PRESSURE: 155 MMHG | BODY MASS INDEX: 21.8 KG/M2 | WEIGHT: 149.8 LBS | HEART RATE: 54 BPM | RESPIRATION RATE: 18 BRPM | TEMPERATURE: 97 F | OXYGEN SATURATION: 95 % | DIASTOLIC BLOOD PRESSURE: 87 MMHG

## 2022-07-07 DIAGNOSIS — R07.9 CHEST PAIN, UNSPECIFIED TYPE: ICD-10-CM

## 2022-07-07 DIAGNOSIS — K21.9 ACID REFLUX: Primary | ICD-10-CM

## 2022-07-07 DIAGNOSIS — J43.9 EMPHYSEMA LUNG (HCC): ICD-10-CM

## 2022-07-07 LAB
2HR DELTA HS TROPONIN: 0 NG/L
ALBUMIN SERPL BCP-MCNC: 3.7 G/DL (ref 3.5–5)
ALP SERPL-CCNC: 60 U/L (ref 46–116)
ALT SERPL W P-5'-P-CCNC: 23 U/L (ref 12–78)
ANION GAP SERPL CALCULATED.3IONS-SCNC: 9 MMOL/L (ref 4–13)
AST SERPL W P-5'-P-CCNC: 21 U/L (ref 5–45)
BASOPHILS # BLD AUTO: 0.07 THOUSANDS/ΜL (ref 0–0.1)
BASOPHILS NFR BLD AUTO: 1 % (ref 0–1)
BILIRUB SERPL-MCNC: 0.67 MG/DL (ref 0.2–1)
BUN SERPL-MCNC: 11 MG/DL (ref 5–25)
CALCIUM SERPL-MCNC: 8.7 MG/DL (ref 8.3–10.1)
CARDIAC TROPONIN I PNL SERPL HS: 3 NG/L
CARDIAC TROPONIN I PNL SERPL HS: 3 NG/L
CHLORIDE SERPL-SCNC: 104 MMOL/L (ref 100–108)
CK SERPL-CCNC: 88 U/L (ref 39–308)
CO2 SERPL-SCNC: 27 MMOL/L (ref 21–32)
CREAT SERPL-MCNC: 0.76 MG/DL (ref 0.6–1.3)
EOSINOPHIL # BLD AUTO: 0.15 THOUSAND/ΜL (ref 0–0.61)
EOSINOPHIL NFR BLD AUTO: 2 % (ref 0–6)
ERYTHROCYTE [DISTWIDTH] IN BLOOD BY AUTOMATED COUNT: 13.2 % (ref 11.6–15.1)
ETHANOL SERPL-MCNC: <3 MG/DL (ref 0–3)
GFR SERPL CREATININE-BSD FRML MDRD: 100 ML/MIN/1.73SQ M
GLUCOSE SERPL-MCNC: 102 MG/DL (ref 65–140)
HCT VFR BLD AUTO: 42.6 % (ref 36.5–49.3)
HGB BLD-MCNC: 14.2 G/DL (ref 12–17)
IMM GRANULOCYTES # BLD AUTO: 0.03 THOUSAND/UL (ref 0–0.2)
IMM GRANULOCYTES NFR BLD AUTO: 0 % (ref 0–2)
LIPASE SERPL-CCNC: 173 U/L (ref 73–393)
LYMPHOCYTES # BLD AUTO: 2.5 THOUSANDS/ΜL (ref 0.6–4.47)
LYMPHOCYTES NFR BLD AUTO: 30 % (ref 14–44)
MAGNESIUM SERPL-MCNC: 1.9 MG/DL (ref 1.6–2.6)
MCH RBC QN AUTO: 32.9 PG (ref 26.8–34.3)
MCHC RBC AUTO-ENTMCNC: 33.3 G/DL (ref 31.4–37.4)
MCV RBC AUTO: 99 FL (ref 82–98)
MONOCYTES # BLD AUTO: 0.65 THOUSAND/ΜL (ref 0.17–1.22)
MONOCYTES NFR BLD AUTO: 8 % (ref 4–12)
NEUTROPHILS # BLD AUTO: 4.84 THOUSANDS/ΜL (ref 1.85–7.62)
NEUTS SEG NFR BLD AUTO: 59 % (ref 43–75)
NRBC BLD AUTO-RTO: 0 /100 WBCS
PLATELET # BLD AUTO: 259 THOUSANDS/UL (ref 149–390)
PMV BLD AUTO: 9.5 FL (ref 8.9–12.7)
POTASSIUM SERPL-SCNC: 3.9 MMOL/L (ref 3.5–5.3)
PROT SERPL-MCNC: 7.2 G/DL (ref 6.4–8.2)
RBC # BLD AUTO: 4.31 MILLION/UL (ref 3.88–5.62)
SODIUM SERPL-SCNC: 140 MMOL/L (ref 136–145)
WBC # BLD AUTO: 8.24 THOUSAND/UL (ref 4.31–10.16)

## 2022-07-07 PROCEDURE — 82077 ASSAY SPEC XCP UR&BREATH IA: CPT | Performed by: EMERGENCY MEDICINE

## 2022-07-07 PROCEDURE — 83735 ASSAY OF MAGNESIUM: CPT | Performed by: EMERGENCY MEDICINE

## 2022-07-07 PROCEDURE — 80053 COMPREHEN METABOLIC PANEL: CPT | Performed by: EMERGENCY MEDICINE

## 2022-07-07 PROCEDURE — 96361 HYDRATE IV INFUSION ADD-ON: CPT

## 2022-07-07 PROCEDURE — 96375 TX/PRO/DX INJ NEW DRUG ADDON: CPT

## 2022-07-07 PROCEDURE — 82550 ASSAY OF CK (CPK): CPT | Performed by: EMERGENCY MEDICINE

## 2022-07-07 PROCEDURE — 85025 COMPLETE CBC W/AUTO DIFF WBC: CPT | Performed by: EMERGENCY MEDICINE

## 2022-07-07 PROCEDURE — 99285 EMERGENCY DEPT VISIT HI MDM: CPT

## 2022-07-07 PROCEDURE — 87636 SARSCOV2 & INF A&B AMP PRB: CPT | Performed by: EMERGENCY MEDICINE

## 2022-07-07 PROCEDURE — 96374 THER/PROPH/DIAG INJ IV PUSH: CPT

## 2022-07-07 PROCEDURE — 84484 ASSAY OF TROPONIN QUANT: CPT | Performed by: EMERGENCY MEDICINE

## 2022-07-07 PROCEDURE — 93005 ELECTROCARDIOGRAM TRACING: CPT

## 2022-07-07 PROCEDURE — 71045 X-RAY EXAM CHEST 1 VIEW: CPT

## 2022-07-07 PROCEDURE — 99285 EMERGENCY DEPT VISIT HI MDM: CPT | Performed by: EMERGENCY MEDICINE

## 2022-07-07 PROCEDURE — 36415 COLL VENOUS BLD VENIPUNCTURE: CPT | Performed by: EMERGENCY MEDICINE

## 2022-07-07 PROCEDURE — 83690 ASSAY OF LIPASE: CPT | Performed by: EMERGENCY MEDICINE

## 2022-07-07 RX ORDER — ALBUTEROL SULFATE 90 UG/1
2 AEROSOL, METERED RESPIRATORY (INHALATION) EVERY 4 HOURS PRN
Qty: 18 G | Refills: 0 | Status: SHIPPED | OUTPATIENT
Start: 2022-07-07

## 2022-07-07 RX ORDER — ASPIRIN 81 MG/1
324 TABLET, CHEWABLE ORAL ONCE
Status: COMPLETED | OUTPATIENT
Start: 2022-07-07 | End: 2022-07-07

## 2022-07-07 RX ORDER — LIDOCAINE HYDROCHLORIDE 20 MG/ML
15 SOLUTION OROPHARYNGEAL ONCE
Status: COMPLETED | OUTPATIENT
Start: 2022-07-07 | End: 2022-07-07

## 2022-07-07 RX ORDER — MAGNESIUM HYDROXIDE/ALUMINUM HYDROXICE/SIMETHICONE 120; 1200; 1200 MG/30ML; MG/30ML; MG/30ML
30 SUSPENSION ORAL ONCE
Status: COMPLETED | OUTPATIENT
Start: 2022-07-07 | End: 2022-07-07

## 2022-07-07 RX ORDER — NITROGLYCERIN 0.4 MG/1
0.4 TABLET SUBLINGUAL ONCE
Status: COMPLETED | OUTPATIENT
Start: 2022-07-07 | End: 2022-07-07

## 2022-07-07 RX ORDER — FAMOTIDINE 10 MG/ML
40 INJECTION, SOLUTION INTRAVENOUS ONCE
Status: COMPLETED | OUTPATIENT
Start: 2022-07-07 | End: 2022-07-07

## 2022-07-07 RX ORDER — FAMOTIDINE 20 MG/1
20 TABLET, FILM COATED ORAL 2 TIMES DAILY
Qty: 30 TABLET | Refills: 0 | Status: SHIPPED | OUTPATIENT
Start: 2022-07-07

## 2022-07-07 RX ORDER — KETOROLAC TROMETHAMINE 30 MG/ML
15 INJECTION, SOLUTION INTRAMUSCULAR; INTRAVENOUS ONCE
Status: COMPLETED | OUTPATIENT
Start: 2022-07-07 | End: 2022-07-07

## 2022-07-07 RX ADMIN — SODIUM CHLORIDE 1000 ML: 0.9 INJECTION, SOLUTION INTRAVENOUS at 14:17

## 2022-07-07 RX ADMIN — LIDOCAINE HYDROCHLORIDE 15 ML: 20 SOLUTION ORAL; TOPICAL at 16:51

## 2022-07-07 RX ADMIN — KETOROLAC TROMETHAMINE 15 MG: 30 INJECTION, SOLUTION INTRAMUSCULAR at 16:29

## 2022-07-07 RX ADMIN — NITROGLYCERIN 0.4 MG: 0.4 TABLET SUBLINGUAL at 14:43

## 2022-07-07 RX ADMIN — ALUMINA, MAGNESIA, AND SIMETHICONE ORAL SUSPENSION REGULAR STRENGTH 30 ML: 1200; 1200; 120 SUSPENSION ORAL at 16:51

## 2022-07-07 RX ADMIN — ASPIRIN 81 MG CHEWABLE TABLET 324 MG: 81 TABLET CHEWABLE at 14:43

## 2022-07-07 RX ADMIN — FAMOTIDINE 40 MG: 10 INJECTION, SOLUTION INTRAVENOUS at 16:51

## 2022-07-07 NOTE — ED PROVIDER NOTES
History  Chief Complaint   Patient presents with    Chest Pain     States started on 7/1 with upper chest pain that was constant and became worse today and now just left side of chest  No radiation, no diaphoresis, no nausea, no SOB     78-year-old male with past history of heavy daily alcohol use, COPD, TIA, seizure, presents to the ED for evaluation of intermittent left-sided chest discomfort over the past week  Patient denies any fevers or chills  Patient's last alcohol drink was last night  Patient states that his pain is 6/10 and intermittent  Patient feels the pain inside his chest   Patient denies any fevers or shortness of breath  Patient denies any headaches, weakness, or focal neuro deficits  History provided by:  Patient  Chest Pain  Associated symptoms: no abdominal pain, no back pain, no cough, no dizziness, no fatigue, no fever, no headache, no nausea, no shortness of breath, not vomiting and no weakness        Prior to Admission Medications   Prescriptions Last Dose Informant Patient Reported? Taking?   bisacodyl (DULCOLAX) 5 mg EC tablet   No No   Sig: Take 2 tablets (10mg) by mouth as directed by the office for colonoscopy prep    hydrocortisone (ANUSOL-HC) 25 mg suppository   No No   Sig: Insert 1 suppository (25 mg total) into the rectum 2 (two) times a day as needed for hemorrhoids for up to 6 days   polyethylene glycol (GOLYTELY) 4000 mL solution   No No   Sig: Take by mouth as directed by the office for colonoscopy prep  Facility-Administered Medications: None       Past Medical History:   Diagnosis Date    COPD [facility]chronic obstructive pulmonary disease) ([facility] [address] )     Hx of TIA (transient ischemic attack) and stroke     [facility] 2013    last seizure years ago       Past Surgical History:   Procedure Laterality Date    COLONOSCOPY      NO PAST SURGERIES         History reviewed  No pertinent family history    I have reviewed and agree with the history as documented  E-Cigarette/Vaping    E-Cigarette Use Never User      E-Cigarette/Vaping Substances    Nicotine No     THC No     CBD No     Flavoring No     Other No     Unknown No      Social History     Tobacco Use    Smoking status: Former Smoker     Packs/day: 2 00     Years: 30 00     Pack years: 60 00     Types: Cigarettes     Quit date: 2012     Years since quitting: 10 5    Smokeless tobacco: Never Used   Vaping Use    Vaping Use: Never used   Substance Use Topics    Alcohol use: Yes     Comment: 1-2 x week    Drug use: Yes     Types: Marijuana     Comment: occ       Review of Systems   Constitutional: Negative for activity change, fatigue and fever  HENT: Negative for congestion, ear discharge and sore throat  Eyes: Negative for pain and redness  Respiratory: Negative for cough, chest tightness, shortness of breath and wheezing  Cardiovascular: Positive for chest pain  Gastrointestinal: Negative for abdominal pain, diarrhea, nausea and vomiting  Endocrine: Negative for cold intolerance  Genitourinary: Negative for dysuria and urgency  Musculoskeletal: Negative for arthralgias and back pain  Neurological: Negative for dizziness, weakness and headaches  Psychiatric/Behavioral: Negative for agitation and behavioral problems  Physical Exam  Physical Exam  Vitals and nursing note reviewed  Constitutional:       Appearance: He is well-developed  HENT:      Head: Normocephalic and atraumatic  Nose: Nose normal    Eyes:      Conjunctiva/sclera: Conjunctivae normal    Cardiovascular:      Rate and Rhythm: Normal rate and regular rhythm  Heart sounds: Normal heart sounds  Pulmonary:      Effort: Pulmonary effort is normal       Breath sounds: Normal breath sounds  Comments: Lungs are clear to auscultation bilateral   No chest wall tenderness noted to palpation  Abdominal:      General: Bowel sounds are normal  There is no distension        Palpations: Abdomen is soft  Tenderness: There is no abdominal tenderness  Musculoskeletal:         General: Normal range of motion  Cervical back: Normal range of motion and neck supple  Skin:     General: Skin is warm  Neurological:      General: No focal deficit present  Mental Status: He is alert and oriented to person, place, and time  Psychiatric:         Mood and Affect: Mood normal          Behavior: Behavior normal          Thought Content: Thought content normal          Judgment: Judgment normal          Vital Signs  ED Triage Vitals [07/07/22 1404]   Temperature Pulse Respirations Blood Pressure SpO2   98 °F (36 7 °C) 66 20 147/83 96 %      Temp Source Heart Rate Source Patient Position - Orthostatic VS BP Location FiO2 (%)   Tympanic Monitor Lying Right arm --      Pain Score       7           Vitals:    07/07/22 1530 07/07/22 1600 07/07/22 1632 07/07/22 1645   BP: 142/82 132/83 122/94 122/94   Pulse: (!) 47 (!) 47 (!) 54 (!) 52   Patient Position - Orthostatic VS:   Lying          Visual Acuity      ED Medications  Medications   sodium chloride 0 9 % bolus 1,000 mL (0 mL Intravenous Stopped 7/7/22 1517)   aspirin chewable tablet 324 mg (324 mg Oral Given 7/7/22 1443)   nitroglycerin (NITROSTAT) SL tablet 0 4 mg (0 4 mg Sublingual Given 7/7/22 1443)   ketorolac (TORADOL) injection 15 mg (15 mg Intravenous Given 7/7/22 1629)   Famotidine (PF) (PEPCID) injection 40 mg (40 mg Intravenous Given 7/7/22 1651)   Lidocaine Viscous HCl (XYLOCAINE) 2 % mucosal solution 15 mL (15 mL Swish & Swallow Given 7/7/22 1651)   aluminum-magnesium hydroxide-simethicone (MYLANTA) oral suspension 30 mL (30 mL Oral Given 7/7/22 1651)       Diagnostic Studies  Results Reviewed     Procedure Component Value Units Date/Time    HS Troponin I 2hr [[id number]] Collected: 07/07/22 1626    Lab Status:  In process Specimen: Blood from Arm, Left Updated: 07/07/22 1652    HS Troponin I 4hr [[id number]]     Lab Status: No result Specimen: Blood     CK Total with Reflex CKMB [[id number]]  (Normal) Collected: 07/07/22 1414    Lab Status: Final result Specimen: Blood from Arm, Left Updated: 07/07/22 1503     Total CK 88 U/L     Ethanol [[id number]]  (Normal) Collected: 07/07/22 1414    Lab Status: Final result Specimen: Blood from Arm, Left Updated: 07/07/22 1452     Ethanol Lvl <3 mg/dL     HS Troponin 0hr (reflex protocol) [[id number]]  (Normal) Collected: 07/07/22 1414    Lab Status: Final result Specimen: Blood from Arm, Left Updated: 07/07/22 1450     hs TnI 0hr 3 ng/L     Comprehensive metabolic panel [[id number]] Collected: 07/07/22 1414    Lab Status: Final result Specimen: Blood from Arm, Left Updated: 07/07/22 1446     Sodium 140 mmol/L      Potassium 3 9 mmol/L      Chloride 104 mmol/L      CO2 27 mmol/L      ANION GAP 9 mmol/L      BUN 11 mg/dL      Creatinine 0 76 mg/dL      Glucose 102 mg/dL      Calcium 8 7 mg/dL      AST 21 U/L      ALT 23 U/L      Alkaline Phosphatase 60 U/L      Total Protein 7 2 g/dL      Albumin 3 7 g/dL      Total Bilirubin 0 67 mg/dL      eGFR 100 ml/min/1 73sq m     Narrative:      National Kidney Disease Foundation guidelines for Chronic Kidney Disease (CKD):     Stage 1 with normal or high GFR (GFR > 90 mL/min/1 73 square meters)    Stage 2 Mild CKD (GFR = 60-89 mL/min/1 73 square meters)    Stage 3A Moderate CKD (GFR = 45-59 mL/min/1 73 square meters)    Stage 3B Moderate CKD (GFR = 30-44 mL/min/1 73 square meters)    Stage 4 Severe CKD (GFR = 15-29 mL/min/1 73 square meters)    Stage 5 End Stage CKD (GFR <15 mL/min/1 73 square meters)  Note: GFR calculation is accurate only with a steady state creatinine    Magnesium [[id number]]  (Normal) Collected: 07/07/22 1414    Lab Status: Final result Specimen: Blood from Arm, Left Updated: 07/07/22 1446     Magnesium 1 9 mg/dL     Lipase [[id number]]  (Normal) Collected: 07/07/22 1414    Lab Status: Final result Specimen: Blood from Arm, Left Updated: 07/07/22 1446     Lipase 173 u/L     CBC and differential [[id number]]  (Abnormal) Collected: 07/07/22 1414    Lab Status: Final result Specimen: Blood from Arm, Left Updated: 07/07/22 1424     WBC 8 24 Thousand/uL      RBC 4 31 Million/uL      Hemoglobin 14 2 g/dL      Hematocrit 42 6 %      MCV 99 fL      MCH 32 9 pg      MCHC 33 3 g/dL      RDW 13 2 %      MPV 9 5 fL      Platelets 825 Thousands/uL      nRBC 0 /100 WBCs      Neutrophils Relative 59 %      Immat GRANS % 0 %      Lymphocytes Relative 30 %      Monocytes Relative 8 %      Eosinophils Relative 2 %      Basophils Relative 1 %      Neutrophils Absolute 4 84 Thousands/µL      Immature Grans Absolute 0 03 Thousand/uL      Lymphocytes Absolute 2 50 Thousands/µL      Monocytes Absolute 0 65 Thousand/µL      Eosinophils Absolute 0 15 Thousand/µL      Basophils Absolute 0 07 Thousands/µL     FLU/COVID - if FLU clinically relevant [[id number]] Collected: 07/07/22 1414    Lab Status: In process Specimen: Nares from Nose Updated: 07/07/22 1420    UA w Reflex to Microscopic w Reflex to Culture [[id number]]     Lab Status: No result Specimen: Urine     Rapid drug screen, urine [[id number]]     Lab Status: No result Specimen: Urine                  XR chest 1 view portable   Final Result by Arcadio Boast, MD (07/07 1452)      Severe emphysema with no acute disease                    Workstation performed: [id number]                    Procedures  ECG 12 Lead Documentation Only    Date/Time: 7/7/2022 1:59 PM  Performed by: Annika Lugo DO  Authorized by: Annika Lugo DO     Indications / Diagnosis:  Pain  ECG reviewed by me, the ED Provider: yes    Patient location:  ED  Previous ECG:     Previous ECG:  Compared to current    Similarity:  No change    Comparison to cardiac monitor: Yes    Interpretation:     Interpretation: abnormal    Comments:      Sinus rhythm, rate 67, normal axis, normal intervals, no acute T-wave abnormalities noted, J-point elevations noted in V4 through V6 that is unchanged from previous study, minimal voltage criteria noted for LVH, unchanged study from baseline  ED Course  ED Course as of 07/07/22 1657   Thu Jul 07, 2022   1620 Patient re-examined at bedside  Patient continues to have left-sided chest discomfort  Patient states that the discomfort is improving after medications given  Patient's EKG is at baseline  First troponin is negative  In light of the fact that patient is a heavy drinker, will try some Pepcid as well as GI cocktail for possible reflux symptoms causing patient's pain  Patient will be assessed after Pepcid and GI cocktail as well as 2nd troponin result  SBIRT 20yo+    Flowsheet Row Most Recent Value   SBIRT (23 yo +)    In order to provide better care to our patients, we are screening all of our patients for alcohol and drug use  Would it be okay to ask you these screening questions? No Filed at: 07/07/2022 1411                    MDM  Number of Diagnoses or Management Options  Acid reflux: new and requires workup  Chest pain, unspecified: new and requires workup  Emphysema lung [facility]): new and requires workup  Diagnosis management comments: Obtain blood work, EKG, chest x-ray  Give IV fluids, aspirin, sublingual nitro, and reassess       Amount and/or Complexity of Data Reviewed  Clinical lab tests: ordered and reviewed  Tests in the radiology section of CPT®: ordered and reviewed  Tests in the medicine section of CPT®: ordered and reviewed  Review and summarize past medical records: yes  Independent visualization of images, tracings, or specimens: yes    Risk of Complications, Morbidity, and/or Mortality  General comments: Patient's EKG is at baseline  First troponin was unremarkable  Patient was given sublingual nitro as well as aspirin however he did not note significant difference in his chest discomfort  Patient is a daily drinker    Patient refused any resources for his alcohol use  At this time patient given a trial of Pepcid as well as GI cocktail  Second troponin is pending  Care patient signed out to the next attending who will re-evaluate patient after Pepcid and GI cocktail is given  If patient is feeling better and his 2nd troponin is within normal limits, than patient will be discharged home on Pepcid and follow-up to PCP  Patient referred to cardiology for an outpatient stress test   Patient's chest x-ray today shows concern for severe emphysema  Patient does not complain of any shortness of breath  Patient is satting 97% on room air  Patient will be prescribed albuterol inhaler and referred to pulmonologist in our network      Patient Progress  Patient progress: stable      Disposition  Final diagnoses:   Chest pain, unspecified   Acid reflux   Emphysema lung ([facility] [address] )     Time reflects when diagnosis was documented in both MDM as applicable and the Disposition within this note     Time User Action Codes Description Comment    2022  4:48 PM Chelsea Jo Add [R07 9] Chest pain, unspecified     2022  4:49 PM Domingo Bustamante Add [K21 9] Acid reflux     2022  4:53 PM Roberth Bustamante Add [J43 9] Emphysema lung ([facility] [address] )       ED Disposition     None      Follow-up Information     Follow up With Specialties Details Why Contact Info    Jossy Moreno MD Cardiology  For further evaluation of your chest pain and to obtain an outpatient stress test  [address] [facility] [place]      Roney Seda, DO Pulmonology, Critical Care Medicine, Neurology, Pulmonary Disease Schedule an appointment as soon as possible for a visit  For further evaluation of your emphysema [address]      Lorena Orellana MD Family Medicine In 3 days  [address]  [phone]            Patient's Medications   Discharge Prescriptions ALBUTEROL (VENTOLIN HFA) 90 MCG/ACT INHALER    Inhale 2 puffs every 4 (four) hours as needed for wheezing or shortness of breath       Start Date: 7/7/2022  End Date: --       Order Dose: 2 puffs       Quantity: 18 g    Refills: 0    FAMOTIDINE (PEPCID) 20 MG TABLET    Take 1 tablet (20 mg total) by mouth 2 (two) times a day       Start Date: 7/7/2022  End Date: --       Order Dose: 20 mg       Quantity: 30 tablet    Refills: 0       No discharge procedures on file      PDMP Review     None          ED Provider  Electronically Signed by           Alonso Haynes DO  07/07/22 5737

## 2022-07-08 ENCOUNTER — CONSULT (OUTPATIENT)
Dept: CARDIOLOGY CLINIC | Facility: CLINIC | Age: 57
End: 2022-07-08
Payer: COMMERCIAL

## 2022-07-08 VITALS
WEIGHT: 147 LBS | HEIGHT: 70 IN | OXYGEN SATURATION: 98 % | DIASTOLIC BLOOD PRESSURE: 76 MMHG | TEMPERATURE: 97 F | SYSTOLIC BLOOD PRESSURE: 100 MMHG | BODY MASS INDEX: 21.05 KG/M2 | HEART RATE: 76 BPM

## 2022-07-08 DIAGNOSIS — J43.9 PULMONARY EMPHYSEMA, UNSPECIFIED EMPHYSEMA TYPE (HCC): ICD-10-CM

## 2022-07-08 DIAGNOSIS — G45.9 TIA (TRANSIENT ISCHEMIC ATTACK): ICD-10-CM

## 2022-07-08 DIAGNOSIS — R07.9 CHEST PAIN, UNSPECIFIED TYPE: ICD-10-CM

## 2022-07-08 DIAGNOSIS — F10.10 ALCOHOL ABUSE: ICD-10-CM

## 2022-07-08 DIAGNOSIS — Z72.0 TOBACCO ABUSE: ICD-10-CM

## 2022-07-08 DIAGNOSIS — E78.5 DYSLIPIDEMIA: ICD-10-CM

## 2022-07-08 DIAGNOSIS — R94.31 ABNORMAL EKG: ICD-10-CM

## 2022-07-08 LAB
ATRIAL RATE: 67 BPM
FLUAV RNA RESP QL NAA+PROBE: NEGATIVE
FLUBV RNA RESP QL NAA+PROBE: NEGATIVE
P AXIS: 65 DEGREES
PR INTERVAL: 124 MS
QRS AXIS: 36 DEGREES
QRSD INTERVAL: 98 MS
QT INTERVAL: 394 MS
QTC INTERVAL: 416 MS
SARS-COV-2 RNA RESP QL NAA+PROBE: NEGATIVE
T WAVE AXIS: 61 DEGREES
VENTRICULAR RATE: 67 BPM

## 2022-07-08 PROCEDURE — 93010 ELECTROCARDIOGRAM REPORT: CPT | Performed by: INTERNAL MEDICINE

## 2022-07-08 PROCEDURE — 99205 OFFICE O/P NEW HI 60 MIN: CPT | Performed by: INTERNAL MEDICINE

## 2022-07-08 NOTE — RESULT ENCOUNTER NOTE
I called and verified patient by name and birth date  Informed of negative COVID/flu results   All questions answered

## 2022-07-12 ENCOUNTER — TELEPHONE (OUTPATIENT)
Dept: FAMILY MEDICINE CLINIC | Facility: CLINIC | Age: 57
End: 2022-07-12

## 2022-07-12 NOTE — TELEPHONE ENCOUNTER
Pt no showed appt on 7/11/22  He went to [facility] by mistake and waited there for 20 min  By the time they realized it was too late    He rescheduled for 7/14/22

## 2022-07-14 ENCOUNTER — OFFICE VISIT (OUTPATIENT)
Dept: FAMILY MEDICINE CLINIC | Facility: CLINIC | Age: 57
End: 2022-07-14
Payer: COMMERCIAL

## 2022-07-14 VITALS
OXYGEN SATURATION: 98 % | DIASTOLIC BLOOD PRESSURE: 70 MMHG | SYSTOLIC BLOOD PRESSURE: 110 MMHG | TEMPERATURE: 97.6 F | HEART RATE: 57 BPM | HEIGHT: 70 IN | RESPIRATION RATE: 18 BRPM | BODY MASS INDEX: 21.47 KG/M2 | WEIGHT: 150 LBS

## 2022-07-14 DIAGNOSIS — F10.10 ALCOHOL ABUSE: ICD-10-CM

## 2022-07-14 DIAGNOSIS — R07.9 CHEST PAIN, UNSPECIFIED TYPE: ICD-10-CM

## 2022-07-14 DIAGNOSIS — J43.9 PULMONARY EMPHYSEMA, UNSPECIFIED EMPHYSEMA TYPE (HCC): Primary | ICD-10-CM

## 2022-07-14 DIAGNOSIS — E78.5 DYSLIPIDEMIA: ICD-10-CM

## 2022-07-14 DIAGNOSIS — G45.9 TIA (TRANSIENT ISCHEMIC ATTACK): ICD-10-CM

## 2022-07-14 PROBLEM — R56.9 SEIZURES (HCC): Status: RESOLVED | Noted: 2022-04-29 | Resolved: 2022-07-14

## 2022-07-14 PROCEDURE — 3725F SCREEN DEPRESSION PERFORMED: CPT | Performed by: FAMILY MEDICINE

## 2022-07-14 PROCEDURE — 99214 OFFICE O/P EST MOD 30 MIN: CPT | Performed by: FAMILY MEDICINE

## 2022-07-14 NOTE — PROGRESS NOTES
Assessment/Plan:       Diagnoses and all orders for this visit:    Pulmonary emphysema, unspecified emphysema type ([address] )  Comments:  He has history of severe emphysema  Former smoker, quit 9 years ago  No shortness of breath, wheezing, cough  Will be scheduling appt with pulmonolgy  Alcohol abuse  Comments:  Counseled on alcohol cessation  Dyslipidemia  Comments:  Counseled on lifestyle changes  TIA (transient ischemic attack)    Chest pain, unspecified type  Comments:  He is here for ER follow up visit after having episode of chest pain on 7/7/22  The pain has improved now, but he gets it intermittently  He did see cardiology  He has stress test and echo scheduled  Counseled on starting pepcid for possible GERD  Counseled on alcohol cessation  He will follow up with pulmonology for severe emphysema, but currently reports no respiratory issues  Subjective:      Patient ID: Benigno Wu is a 60 y o  male  HPI  Clinton Sung presents today for ER follow up visit after being seen at [facility] ER on 7/7/22 for chest pain  CXR and blood work with no significant abnormalities noted  EKG shows sinus rhythm with acute ST changes  The following portions of the patient's history were reviewed and updated as appropriate: allergies, current medications, past family history, past medical history, past social history, past surgical history and problem list     Review of Systems   Constitutional: Negative  HENT: Negative  Eyes: Negative  Respiratory: Negative  Cardiovascular: Positive for chest pain  Gastrointestinal: Negative  Endocrine: Negative  Genitourinary: Negative  Musculoskeletal: Negative  Neurological: Negative  Hematological: Negative  Psychiatric/Behavioral: Negative            Objective:      /70   Pulse 57   Temp 97 6 °F (36 4 °C)   Resp 18   Ht 5' 9 5" (1 765 m)   Wt 68 kg (150 lb)   SpO2 98%   BMI 21 83 kg/m²          Physical Exam  Constitutional: General: He is not in acute distress  Appearance: He is well-developed  He is not diaphoretic  HENT:      Head: Normocephalic and atraumatic  Eyes:      General: No scleral icterus  Right eye: No discharge  Left eye: No discharge  Conjunctiva/sclera: Conjunctivae normal    Cardiovascular:      Rate and Rhythm: Normal rate and regular rhythm  Pulses: Normal pulses  Pulmonary:      Effort: Pulmonary effort is normal  No respiratory distress  Breath sounds: Normal breath sounds  No stridor  No wheezing, rhonchi or rales  Chest:      Chest wall: No tenderness  Musculoskeletal:         General: Normal range of motion  Cervical back: Normal range of motion  Skin:     General: Skin is warm  Neurological:      Mental Status: He is alert and oriented to person, place, and time  Psychiatric:         Mood and Affect: Mood normal          Behavior: Behavior normal          Thought Content:  Thought content normal          Judgment: Judgment normal

## 2022-07-22 ENCOUNTER — TELEPHONE (OUTPATIENT)
Dept: CARDIOLOGY CLINIC | Facility: CLINIC | Age: 57
End: 2022-07-22

## 2022-07-22 ENCOUNTER — HOSPITAL ENCOUNTER (OUTPATIENT)
Dept: NON INVASIVE DIAGNOSTICS | Facility: HOSPITAL | Age: 57
Discharge: HOME | End: 2022-07-22
Attending: INTERNAL MEDICINE
Payer: COMMERCIAL

## 2022-07-22 ENCOUNTER — APPOINTMENT (OUTPATIENT)
Dept: RADIOLOGY | Facility: HOSPITAL | Age: 57
End: 2022-07-22
Attending: INTERNAL MEDICINE
Payer: COMMERCIAL

## 2022-07-22 ENCOUNTER — APPOINTMENT (OUTPATIENT)
Dept: NON INVASIVE DIAGNOSTICS | Facility: HOSPITAL | Age: 57
End: 2022-07-22
Attending: INTERNAL MEDICINE
Payer: COMMERCIAL

## 2022-07-22 VITALS
SYSTOLIC BLOOD PRESSURE: 125 MMHG | BODY MASS INDEX: 21.47 KG/M2 | HEIGHT: 70 IN | DIASTOLIC BLOOD PRESSURE: 84 MMHG | HEART RATE: 76 BPM | WEIGHT: 150 LBS

## 2022-07-22 DIAGNOSIS — Z72.0 TOBACCO ABUSE: ICD-10-CM

## 2022-07-22 DIAGNOSIS — E78.5 DYSLIPIDEMIA: ICD-10-CM

## 2022-07-22 DIAGNOSIS — R94.31 ABNORMAL EKG: ICD-10-CM

## 2022-07-22 DIAGNOSIS — R07.9 CHEST PAIN, UNSPECIFIED TYPE: ICD-10-CM

## 2022-07-22 DIAGNOSIS — J43.9 PULMONARY EMPHYSEMA, UNSPECIFIED EMPHYSEMA TYPE (HCC): ICD-10-CM

## 2022-07-22 DIAGNOSIS — G45.9 TIA (TRANSIENT ISCHEMIC ATTACK): ICD-10-CM

## 2022-07-22 DIAGNOSIS — F10.10 ALCOHOL ABUSE: ICD-10-CM

## 2022-07-22 LAB
AORTIC ROOT: 3.7 CM
APICAL FOUR CHAMBER EJECTION FRACTION: 52 %
E WAVE DECELERATION TIME: 221 MS
FRACTIONAL SHORTENING: 30 (ref 28–44)
INTERVENTRICULAR SEPTUM IN DIASTOLE (PARASTERNAL SHORT AXIS VIEW): 0.8 CM
INTERVENTRICULAR SEPTUM: 0.8 CM (ref 0.6–1.1)
LAAS-AP2: 21.4 CM2
LAAS-AP4: 18 CM2
LEFT ATRIUM SIZE: 3.2 CM
LEFT INTERNAL DIMENSION IN SYSTOLE: 3.8 CM (ref 2.1–4)
LEFT VENTRICULAR INTERNAL DIMENSION IN DIASTOLE: 5.4 CM (ref 3.5–6)
LEFT VENTRICULAR POSTERIOR WALL IN END DIASTOLE: 0.9 CM
LEFT VENTRICULAR STROKE VOLUME: 81 ML
LVSV (TEICH): 81 ML
MV E'TISSUE VEL-SEP: 11 CM/S
MV PEAK A VEL: 0.64 M/S
MV PEAK E VEL: 63 CM/S
MV STENOSIS PRESSURE HALF TIME: 64 MS
MV VALVE AREA P 1/2 METHOD: 3.44
RIGHT ATRIUM AREA SYSTOLE A4C: 13.6 CM2
RIGHT VENTRICLE ID DIMENSION: 3.8 CM
SL CV LEFT ATRIUM LENGTH A2C: 5.1 CM
SL CV LV EF: 60
SL CV PED ECHO LEFT VENTRICLE DIASTOLIC VOLUME (MOD BIPLANE) 2D: 143 ML
SL CV PED ECHO LEFT VENTRICLE SYSTOLIC VOLUME (MOD BIPLANE) 2D: 63 ML

## 2022-07-22 PROCEDURE — 93306 TTE W/DOPPLER COMPLETE: CPT | Performed by: INTERNAL MEDICINE

## 2022-07-22 PROCEDURE — 93306 TTE W/DOPPLER COMPLETE: CPT

## 2022-07-22 NOTE — TELEPHONE ENCOUNTER
----- Message from Isael Dobbins MD sent at 7/22/2022 11:16 AM EDT -----  Patient's echo shows normal LV systolic function  No significant, to mild valvular disease  Patient can keep an appointment  Please call patient about echo report

## 2022-11-15 ENCOUNTER — OFFICE VISIT (OUTPATIENT)
Dept: CARDIOLOGY CLINIC | Facility: CLINIC | Age: 57
End: 2022-11-15

## 2022-11-15 VITALS
DIASTOLIC BLOOD PRESSURE: 70 MMHG | BODY MASS INDEX: 22.76 KG/M2 | OXYGEN SATURATION: 95 % | WEIGHT: 159 LBS | TEMPERATURE: 98 F | HEIGHT: 70 IN | HEART RATE: 69 BPM | SYSTOLIC BLOOD PRESSURE: 110 MMHG

## 2022-11-15 DIAGNOSIS — R07.9 CHEST PAIN, UNSPECIFIED TYPE: ICD-10-CM

## 2022-11-15 DIAGNOSIS — R94.31 ABNORMAL EKG: ICD-10-CM

## 2022-11-15 DIAGNOSIS — R06.02 SHORTNESS OF BREATH: ICD-10-CM

## 2022-11-15 DIAGNOSIS — J43.9 PULMONARY EMPHYSEMA, UNSPECIFIED EMPHYSEMA TYPE (HCC): ICD-10-CM

## 2022-11-15 DIAGNOSIS — E78.5 DYSLIPIDEMIA: ICD-10-CM

## 2022-11-15 DIAGNOSIS — Z72.0 TOBACCO ABUSE: ICD-10-CM

## 2022-11-15 DIAGNOSIS — F10.10 ALCOHOL ABUSE: ICD-10-CM

## 2022-11-15 NOTE — PROGRESS NOTES
Progress note - Cardiology Office   [facility]  Kalin Wise 59 y o  male MRN: [id number]  : 1965  Unit/Bed#:  Encounter: [id number]      Assessment:     1  Chest pain, unspecified type    2  Dyslipidemia    3  Abnormal EKG    4  Alcohol abuse    5  Tobacco abuse    6  Pulmonary emphysema, unspecified emphysema type ([address] ) severe    7  Shortness of breath        Discussion summary and Plan:     1  Atypical chest pain  Etiology may be multifactorial   He is known to have severe emphysema by chest x-ray  But he has chronic exertional shortness of breath  Recommend to have him a stress test done as he has multiple risk factors  2  History of COPD emphysema used to smoke heavily now quit smoking  Continue use of inhaler     3  Dyslipidemia with cardiovascular event around 5- 6%  If he has abnormal stress test we will consider an statin therapy     4  History of TIA  Need to establish care with medical team and need to be on statin therapy if documented TIA and quit smoking  5    Continues alcohol abuse   Lifestyle modification recommended  Patient still drinks 4-6 beers almost every day and over the weekends  6  Abnormal EKG  EKG shows normal sinus rhythm Q-wave in lateral leads can not rule out infarction  Prominent T-waves  Echo shows normal LV systolic function stress test ordered      Plan  1  Exercise nuclear stress test    2  Lifestyle modification      Patient was advised and educated to call our office  immediately if  patient has any new symptoms of chest pain/shortness of breath, near-syncope, syncope, light headedness sustained palpitations  or any other cardiovascular symptoms before their scheduled follow-up appointment  Office #[phone]  Thank you for your consultation  If you have any question please call me at [phone]    Counseling :  A description of the counseling  Goals and Barriers    Patient's ability to self care: Yes  Medication side effect reviewed with patient in detail and all their questions answered to their satisfaction  Primary Care Physician : Torrey Saravia MD      HPI :     Geena Maldonado is a 59y o  year old male who was referred  Emergency room as patient was in the hospital with episode of chest pain  He has medical history significant for heavy tobacco abuse now quit smoking, history of emphysema, TIA, dyslipidemia who was having episodes of chest pain  He describes the pain as pressure-like which was constant and still present  His troponins were negative  He drinks 4-6 beers every day sometime over the weekend he drinks heavily  This has been going on for some time  He used to smoke about 2 packs a day for about 30 years but he quit smoking 9 years ago  Currently he is  Take no medications  His EKG shows normal sinus rhythm prominent T-waves and Q-waves noted in lateral leads cannot rule out lateral wall infarct  No nausea no vomiting no fever no chills  He has limited medical care he does not like to go doctor's     he denies any family history of premature coronary artery disease     No recent surgeries no allergies  He does have history of TIA many years ago  He is not on any statin therapy  11/15/2022  Above reviewed  Patient came for follow-up he had a medical history significant for heavy tobacco abuse now quit smoking, history of emphysema, TIA, dyslipidemia with on and off episodes of chest pain  He describes the chest pain as pressure-like she has constant still present  Troponins were negative  Still drinks 4-6 beers every day sometimes heavily on the weekend  No nausea no vomiting no fever no other issues  He smoked about 2 packs a day for about 30 years and quit smoking 9 years ago  Currently he is using p r n  is inhalers and his EKG shows prominent E wave with Q-wave noted in lateral leads  Can not rule out lateral infarct    Echo Doppler shows normal LV systolic function  He cannot walk on the treadmill  Review of Systems   Constitutional: Negative for activity change, chills, diaphoresis, fever and unexpected weight change  HENT: Negative for congestion  Eyes: Negative for discharge and redness  Respiratory: Positive for shortness of breath  Negative for cough, chest tightness and wheezing  Cardiovascular: Positive for chest pain  Negative for palpitations and leg swelling  Gastrointestinal: Negative for abdominal pain, diarrhea and nausea  Endocrine: Negative  Genitourinary: Negative for decreased urine volume and urgency  Musculoskeletal: Positive for gait problem  Negative for arthralgias and back pain  Skin: Negative for rash and wound  Allergic/Immunologic: Negative  Neurological: Negative for dizziness, seizures, syncope, weakness, light-headedness and headaches  Hematological: Negative  Psychiatric/Behavioral: Negative for agitation and confusion  The patient is nervous/anxious  Historical Information   Past Medical History:   Diagnosis Date   • COPD (chronic obstructive pulmonary disease) ([facility] )    • Hx of TIA (transient ischemic attack) and stroke    • Seizures ([facility] ) 2013    last seizure years ago     Past Surgical History:   Procedure Laterality Date   • COLONOSCOPY     • NO PAST SURGERIES       Social History     Substance and Sexual Activity   Alcohol Use Yes    Comment: 1-2 x week     Social History     Substance and Sexual Activity   Drug Use Yes   • Types: Marijuana    Comment: occ     Social History     Tobacco Use   Smoking Status Former Smoker   • Packs/day: 2 00   • Years: 30 00   • Pack years: 60 00   • Types: Cigarettes   • Quit date: 2012   • Years since quitting: 10 8   Smokeless Tobacco Never Used     Family History: No family history on file      Meds/Allergies     No Known Allergies    Current Outpatient Medications:   •  albuterol (Ventolin HFA) 90 mcg/act inhaler, Inhale 2 puffs every 4 (four) hours as needed for wheezing or shortness of breath (Patient not taking: No sig reported), Disp: 18 g, Rfl: 0  •  famotidine (PEPCID) 20 mg tablet, Take 1 tablet (20 mg total) by mouth 2 (two) times a day (Patient not taking: No sig reported), Disp: 30 tablet, Rfl: 0    Vitals: Blood pressure 110/70, pulse 69, temperature 98 °F (36 7 °C), height 5' 9 5" (1 765 m), weight 72 1 kg (159 lb), SpO2 95 %  ?  Body mass index is 23 14 kg/m²  Wt Readings from Last 3 Encounters:   11/15/22 72 1 kg (159 lb)   07/22/22 68 kg (150 lb)   07/14/22 68 kg (150 lb)     Vitals:    11/15/22 1558   Weight: 72 1 kg (159 lb)     BP Readings from Last 3 Encounters:   11/15/22 110/70   07/22/22 125/84   07/14/22 110/70         Physical Exam  Constitutional:       General: He is not in acute distress  Appearance: He is well-developed  He is not diaphoretic  Neck:      Thyroid: No thyromegaly  Vascular: No JVD  Trachea: No tracheal deviation  Cardiovascular:      Rate and Rhythm: Normal rate and regular rhythm  Heart sounds: S1 normal and S2 normal  Heart sounds not distant  Murmur heard  Systolic (ejection) murmur is present with a grade of 2/6  No friction rub  No gallop  No S3 or S4 sounds  Pulmonary:      Effort: Pulmonary effort is normal  No respiratory distress  Breath sounds: Normal breath sounds  No wheezing or rales  Chest:      Chest wall: No tenderness  Abdominal:      General: Bowel sounds are normal  There is no distension  Palpations: Abdomen is soft  Tenderness: There is no abdominal tenderness  Musculoskeletal:         General: No deformity  Cervical back: Neck supple  Skin:     General: Skin is warm and dry  Coloration: Skin is not pale  Findings: No rash  Neurological:      Mental Status: He is alert and oriented to person, place, and time     Psychiatric:         Behavior: Behavior normal          Judgment: Judgment normal            Diagnostic Studies Review Cardio:     echo Doppler done 2022 shows EF is 60%, left atrium upper normal size  Trace MR  No other significant abnormality noted      EK lead EKG done in the hospital shows sinus rhythm prominent T-waves and Q note in lateral lead cannot rule out lateral infarction versus pseudo infarct pattern    XR chest 1 view portable    Result Date: 2022  Narrative: CHEST INDICATION:   pain  COMPARISON:  Chest radiograph from 2015, chest CT from 10/22/2015  EXAM PERFORMED/VIEWS:  XR CHEST PORTABLE FINDINGS: Cardiomediastinal silhouette appears unremarkable  Severe emphysema  No acute disease  No effusion or pneumothorax  Osseous structures appear within normal limits for patient age  Impression: Severe emphysema with no acute disease  Workstation performed: [id number]       Imaging:  Chest X-Ray:   No Chest XR results available for this patient  CT-scan of the chest:     No CTA results available for this patient    Lab Review   Lab Results   Component Value Date    WBC 8 24 2022    HGB 14 2 2022    HCT 42 6 2022    MCV 99 (H) 2022    RDW 13 2 2022     2022     BMP:  Lab Results   Component Value Date    SODIUM 140 2022    K 3 9 2022     2022    CO2 27 2022    ANIONGAP 10 2 2015    BUN 11 2022    CREATININE 0 76 2022    GLUC 102 2022    GLUF 92 2022    CALCIUM 8 7 2022    EGFR 100 2022    MG 1 9 2022     Troponins:      LFT:  Lab Results   Component Value Date    AST 21 2022    ALT 23 2022    ALKPHOS 60 2022    TP 7 2 2022    ALB 3 7 2022      Lab Results   Component Value Date    WVC1ZUCASRUO 1 260 2022     No components found for: TSH3  No results found for: HGBA1C  Lipid Profile:   Lab Results   Component Value Date    CHOLESTEROL 165 2022    HDL 38 (L) 2022    LDLCALC 79 2022    TRIG 238 (H) 2022     Lab Results   Component Value Date    CHOLESTEROL 165 04/27/2022     Lab Results   Component Value Date    CKTOTAL 88 07/07/2022    TROPONINI <0 02 11/09/2015     The 10-year ASCVD risk score (Lisa Cortes et al , 2013) is: 6 3%    Values used to calculate the score:      Age: 59 years      Sex: Male      Is Non- : No      Diabetic: No      Tobacco smoker: No      Systolic Blood Pressure: 998 mmHg      Is BP treated: No      HDL Cholesterol: 38 mg/dL      Total Cholesterol: 165 mg/dL        Dr Hilary Hayes MD [facility]      "This note has been constructed using a voice recognition system  Therefore there may be syntax, spelling, and/or grammatical errors   Please call if you have any questions  "

## 2023-02-15 ENCOUNTER — OFFICE VISIT (OUTPATIENT)
Dept: FAMILY MEDICINE CLINIC | Facility: CLINIC | Age: 58
End: 2023-02-15

## 2023-02-15 ENCOUNTER — NURSE TRIAGE (OUTPATIENT)
Dept: OTHER | Facility: OTHER | Age: 58
End: 2023-02-15

## 2023-02-15 VITALS
DIASTOLIC BLOOD PRESSURE: 88 MMHG | OXYGEN SATURATION: 96 % | BODY MASS INDEX: 23 KG/M2 | SYSTOLIC BLOOD PRESSURE: 132 MMHG | RESPIRATION RATE: 18 BRPM | WEIGHT: 158 LBS | HEART RATE: 85 BPM | TEMPERATURE: 99 F

## 2023-02-15 DIAGNOSIS — J43.9 PULMONARY EMPHYSEMA, UNSPECIFIED EMPHYSEMA TYPE (HCC): Primary | ICD-10-CM

## 2023-02-15 DIAGNOSIS — N52.9 ERECTILE DYSFUNCTION, UNSPECIFIED ERECTILE DYSFUNCTION TYPE: ICD-10-CM

## 2023-02-15 DIAGNOSIS — R39.15 URGENCY OF URINATION: ICD-10-CM

## 2023-02-15 LAB
SL AMB  POCT GLUCOSE, UA: ABNORMAL
SL AMB LEUKOCYTE ESTERASE,UA: ABNORMAL
SL AMB POCT BILIRUBIN,UA: ABNORMAL
SL AMB POCT BLOOD,UA: ABNORMAL
SL AMB POCT CLARITY,UA: ABNORMAL
SL AMB POCT COLOR,UA: ABNORMAL
SL AMB POCT KETONES,UA: ABNORMAL
SL AMB POCT NITRITE,UA: ABNORMAL
SL AMB POCT PH,UA: 6
SL AMB POCT SPECIFIC GRAVITY,UA: 1.03
SL AMB POCT URINE PROTEIN: 100
SL AMB POCT UROBILINOGEN: 1

## 2023-02-15 RX ORDER — SULFAMETHOXAZOLE AND TRIMETHOPRIM 800; 160 MG/1; MG/1
1 TABLET ORAL EVERY 12 HOURS SCHEDULED
Qty: 14 TABLET | Refills: 0 | Status: SHIPPED | OUTPATIENT
Start: 2023-02-15 | End: 2023-02-22

## 2023-02-15 NOTE — TELEPHONE ENCOUNTER
Reason for Disposition  • All other urine symptoms    Answer Assessment - Initial Assessment Questions  1  SYMPTOM: "What's the main symptom you're concerned about?" (e g , frequency, incontinence)      Cannot ejaculate and and when patient urinates he states its a slow stream  2  ONSET: "When did the slow stream and inability to ejaculate  start?"      A few days ago  3  PAIN: "Is there any pain?" If Yes, ask: "How bad is it?" (Scale: 1-10; mild, moderate, severe)      Very little pain in penis  4  CAUSE: "What do you think is causing the symptoms?"      Unsure   5  OTHER SYMPTOMS: "Do you have any other symptoms?" (e g , fever, flank pain, blood in urine, pain with urination)      No   6   PREGNANCY: "Is there any chance you are pregnant?" "When was your last menstrual period?"      No    Protocols used: [facility]

## 2023-02-15 NOTE — TELEPHONE ENCOUNTER
Regarding: slow stream with urination, unable to ejaculate  ----- Message from Teo Romero sent at 2/15/2023  4:26 PM EST -----  " For the past couple of weeks I have not been able to ejaculate during sex and when I urinate it's a slow stream "

## 2023-02-16 NOTE — PROGRESS NOTES
Name: Massiel Wynn      : 1965      MRN: [id number]  Encounter Provider: Shante De Los Santos MD  Encounter Date: 2/15/2023   Encounter department: [address] [place] [address]     1  Pulmonary emphysema, unspecified emphysema type ([facility] )  Comments:  stable    2  Urgency of urination  -     POCT urine dip auto non-scope  -     Urine culture  -     sulfamethoxazole-trimethoprim (BACTRIM DS) 800-160 mg per tablet; Take 1 tablet by mouth every 12 (twelve) hours for 7 days    3  Erectile dysfunction, unspecified erectile dysfunction type  -     Ambulatory Referral to Urology; Future         Subjective      HPI   He reports about 3 week history of issues with ejaculation and urination  He tried to ejaculate during sex, but nothing comes out  He does maintain an erection, but he does not ejaculate until a while later and the semen comes out very slowly  He also reports a weak urinary stream, urinary hesitancy, mild burning on urination, feels like he doesn't empty his bladder, urgency  Denies fevers, chills, flank pain, hematuria, nausea, vomiting, abdominal pain  Review of Systems   Constitutional: Negative  HENT: Negative  Eyes: Negative  Respiratory: Negative  Cardiovascular: Negative  Gastrointestinal: Negative  Endocrine: Negative  Genitourinary: Positive for difficulty urinating, dysuria, frequency, hematuria and urgency  Musculoskeletal: Negative  Neurological: Negative  Hematological: Negative  Psychiatric/Behavioral: Negative          Current Outpatient Medications on File Prior to Visit   Medication Sig   • [DISCONTINUED] albuterol (Ventolin HFA) 90 mcg/act inhaler Inhale 2 puffs every 4 (four) hours as needed for wheezing or shortness of breath (Patient not taking: Reported on 2022)   • [DISCONTINUED] famotidine (PEPCID) 20 mg tablet Take 1 tablet (20 mg total) by mouth 2 (two) times a day (Patient not taking: Reported on 2022) Objective     /88   Pulse 85   Temp 99 °F (37 2 °C)   Resp 18   Wt 71 7 kg (158 lb)   SpO2 96%   BMI 23 00 kg/m²     Physical Exam  Constitutional:       General: He is not in acute distress  Appearance: He is well-developed  He is not diaphoretic  HENT:      Head: Normocephalic and atraumatic  Eyes:      General: No scleral icterus  Right eye: No discharge  Left eye: No discharge  Conjunctiva/sclera: Conjunctivae normal    Cardiovascular:      Rate and Rhythm: Normal rate and regular rhythm  Pulses: Normal pulses  Pulmonary:      Effort: Pulmonary effort is normal  No respiratory distress  Breath sounds: Normal breath sounds  No stridor  No wheezing, rhonchi or rales  Chest:      Chest wall: No tenderness  Abdominal:      General: Abdomen is flat  Bowel sounds are normal  There is no distension  Palpations: Abdomen is soft  There is no mass  Tenderness: There is no abdominal tenderness  There is no right CVA tenderness, left CVA tenderness, guarding or rebound  Hernia: No hernia is present  Musculoskeletal:         General: Normal range of motion  Cervical back: Normal range of motion  Skin:     General: Skin is warm  Neurological:      Mental Status: He is alert and oriented to person, place, and time  Psychiatric:         Mood and Affect: Mood normal          Behavior: Behavior normal          Thought Content:  Thought content normal          Judgment: Judgment normal        Martínez Espinosa MD

## 2023-02-21 ENCOUNTER — TELEPHONE (OUTPATIENT)
Dept: UROLOGY | Facility: AMBULATORY SURGERY CENTER | Age: 58
End: 2023-02-21

## 2023-02-21 NOTE — TELEPHONE ENCOUNTER
Please Triage  New Patient    What is the reason for the patient’s appointment? Referral N52 [id number] (ICD-10-CM) - Erectile dysfunction, unspecified erectile dysfunction type    What office location does the patient prefer? [facility]    Imaging/Lab Results:    Do we accept the patient's insurance or is the patient Self-Pay? Insurance Provider: Curtis Wu  Plan Type/Number:  Member ID#: Has the patient had any previous Urologist(s)? no    Have patient records been requested? If not are records showing in Epic:     Has the patient had any outside testing done? Does the patient have a personal history of cancer?

## 2023-02-26 NOTE — PROGRESS NOTES
Office Visit- Urology  Kash Chery 1965 MRN: [id number]      Assessment/Discussion/Plan    59 y o  male managed by     1  Ejaculatory dysfunction    -Symptomatology seems to be consistent with either retrograde ejaculation or obstructive process within the urethra such as a urethral stricture  Etiology unclear at this point  Potentially related to alcohol use disorder but patient notes that he has not increased his alcohol intake but rather has decreased it over the past year  -We will plan for patient to have a cystoscopy for further evaluation of his urethra as well as his prostate in case patient would like to proceed with surgical intervention rather than pharmacotherapy due to risks of exacerbation of ejaculatory dysfunction on BPH pharmacotherapy  Due to concern for cost associated with cystoscopy in the operating room patient would like to try to see if he can get a in office cystoscopy at an office location in [facility]  2  Prostate Cancer Screening  -PSA was 0 4 in April 2022  -NAVI today not concerning for prostate cancer  -Average risk for prostate cancer  -Patient will be due for a PSA in about 1 month  Informed patient to obtain PSA prior to cystoscopy    3  Lower urinary tract symptoms  -POCT urine dip demonstrates leukocytes  We will send out urine for microanalysis and culture  POCT urine dip from 2/15 did show large blood on UA but no microscopy was obtained   -Patient reports both significant obstructive and irritative urinary symptoms  Is not currently on pharmacotherapy  He feels very bothered by his urinary pattern and would like to trial pharmacotherapy  Discussed with patient that pharmacotherapy that would be most immediately beneficial for symptom relief could exacerbate or prevent resolution of his ejaculatory dysfunction  Patient states that he is more bothered by his urinary symptoms than his ejaculatory dysfunction and is willing to try medication    I will initiate patient on silodosin 8 mg due to patient report that he often has symptoms that could be indicative of orthostatic hypotension as silodosin is the most alpha 1 a receptor selective agent  Patient knows to stop medication and call office if he experiences symptoms indicative of orthostatic hypotension  Reevaluate at follow-up  -urinary stream spraying necessitating that patient sit with every urination and history of previous urethral instrumentation with catheter insertion is concerning for a possible urethral stricture  PVR fortunately is 0 mL today  cystoscopy to evaluate    4  Erectile dysfunction  -Patient reports erectile dysfunction that occurs about 50% of the time in both obtainment and maintenance of erections  Likely multifactorial -alcohol use disorder likely plays a significant part in etiology but patient is precontemplative for alcohol use cessation  -Patient expresses desire to try pharmacotherapy for erections  I will send a epic message to his cardiologist for clearance for trial of a PDE 5 inhibitor  If cleared, will send prescription over and discussed proper administration and side effects of PDE 5 inhibitors      Chief Complaint:   Laverne Amezcua is a 59 y o  male presenting to the office for an initial evaluation regarding  Ejaculatory dysfunction, lower urinary tract symptoms        Subjective       Patient is a 75-year-old male with a past medical history significant for alcohol use disorder, Hx of TIA/stroke, chronic obstructive pulmonary disease who presents to the office today for evaluation of ejaculatory dysfunction and lower urinary tract symptoms  He does not believe that he has been evaluated by a urologist before  He notes that over the past year he has been having ejaculatory dysfunction  He is sexually active with a female partner  He has some difficulty with obtaining/maintaining an erection noting that he is about 50% successful with each attempt    His main concern now is that over the past year when he is able to obtain and maintain an erection and achieve orgasm there is no ejaculate that comes per the urethra  He has an ejaculation with orgasm with both sexual partners and with masturbation  He notes that then between 5 to 20 minutes later the ejaculate will leak out of his urethra  He denies taking any medications  He denies any history of prostatic surgery  He also notes that over the past year his urinary symptoms have severely worsened where he often has a very weak stream, he has to strain to urinate, frequency up to every hour, double voiding, urgency, and sensation of incomplete bladder emptying  He denies urologic history but notes that when he had a medical incident about a decade ago that he did have a urinary catheter that was in place for a month  He states that the discomfort he sometimes experiences in his urethra feels similar to when they removed the urinary catheter  Also notable is that patient states that he has to sit to urinate as if he stands his urine stream will spray all over the place  This has been a chronic issue  He denies any blood in the urine  He states that he drinks about 3 beers a day with multiple shots daily  He notes that his drinking frequency has actually decreased since moving to this area  He is not ready to contemplate alcohol cessation  He denies any recreational drug use besides marijuana  No tobacco use recently-patient has quit 9 years ago  Unsure of how much patient utilize cigarettes when he did smoke for how long  He denies diabetes or other neurologic disease  No pelvic or perineal trauma  Only PSA on file was from April 2022 and was 0 4  Patient has had previous prostate exams without abnormality per patient report  Patient does note that he has dizziness and/or lightheadedness with changes in position        ROS:   Review of Systems   Constitutional: Negative for chills, fatigue and fever     HENT: Negative  Eyes: Negative  Respiratory: Negative for shortness of breath  Cardiovascular: Negative for chest pain  Gastrointestinal: Negative  Endocrine: Negative  Genitourinary: Positive for dysuria (mild), frequency and urgency  Negative for decreased urine volume, difficulty urinating, flank pain, hematuria, scrotal swelling and testicular pain  Musculoskeletal: Negative  Allergic/Immunologic: Negative  Neurological: Negative  Hematological: Negative  Psychiatric/Behavioral: Negative  Past Medical History  Past Medical History:   Diagnosis Date   • COPD (chronic obstructive pulmonary disease) ([facility] )    • Hx of TIA (transient ischemic attack) and stroke    • Seizures ([facility] ) 2013    last seizure years ago       Past Surgical History  Past Surgical History:   Procedure Laterality Date   • COLONOSCOPY     • NO PAST SURGERIES         Past Family History  No family history on file  Past Social history  Social History     Socioeconomic History   • Marital status: Single     Spouse name: Not on file   • Number of children: Not on file   • Years of education: Not on file   • Highest education level: Not on file   Occupational History   • Not on file   Tobacco Use   • Smoking status: Former     Packs/day: 2 00     Years: 41 00     Pack years: 82 00     Types: Cigarettes     Start date: 1971     Quit date: 2012     Years since quittin 1   • Smokeless tobacco: Never   Vaping Use   • Vaping Use: Never used   Substance and Sexual Activity   • Alcohol use:  Yes     Alcohol/week: 6 0 standard drinks     Types: 6 Cans of beer per week   • Drug use: Yes     Types: Marijuana     Comment: occ   • Sexual activity: Not on file   Other Topics Concern   • Not on file   Social History Narrative   • Not on file     Social Determinants of Health     Financial Resource Strain: Not on file   Food Insecurity: Not on file   Transportation Needs: Not on file   Physical Activity: Not on file Stress: Not on file   Social Connections: Not on file   Intimate Partner Violence: Not on file   Housing Stability: Not on file       Current Medications  No current outpatient medications on file  No current facility-administered medications for this visit  Allergies  No Known Allergies    OBJECTIVE    Vitals   There were no vitals filed for this visit  PVR: 0 ml     Physical Exam  Vitals reviewed  Constitutional:       General: He is not in acute distress  Appearance: Normal appearance  He is normal weight  He is not ill-appearing or toxic-appearing  HENT:      Head: Normocephalic and atraumatic  Right Ear: External ear normal       Left Ear: External ear normal    Eyes:      Conjunctiva/sclera: Conjunctivae normal    Cardiovascular:      Rate and Rhythm: Normal rate  Pulmonary:      Effort: Pulmonary effort is normal    Genitourinary:     Comments: Mildly enlarged prostate with no asymmetry, induration, tenderness, or nodules noted  Musculoskeletal:         General: Normal range of motion  Cervical back: Normal range of motion and neck supple  Skin:     General: Skin is warm and dry  Neurological:      General: No focal deficit present  Mental Status: He is alert  Cranial Nerves: No cranial nerve deficit  Psychiatric:         Mood and Affect: Mood normal          Behavior: Behavior normal          Thought Content:  Thought content normal           Labs:     Lab Results   Component Value Date    PSA 0 4 04/27/2022     Lab Results   Component Value Date    CREATININE 0 76 07/07/2022      No results found for: HGBA1C  Lab Results   Component Value Date    GLUCOSE 109 11/09/2015    CALCIUM 8 7 07/07/2022     11/09/2015    K 3 9 07/07/2022    CO2 27 07/07/2022     07/07/2022    BUN 11 07/07/2022    CREATININE 0 76 07/07/2022       I have personally reviewed all pertinent lab results and reviewed with patient    Imaging       rKisti Coreas PA-C  Date: 2/26/2023 Time: 6:53 PM  [facility]    This note was written using fluency dictation software  Please excuse any resulting minor grammatical errors

## 2023-02-27 ENCOUNTER — OFFICE VISIT (OUTPATIENT)
Dept: UROLOGY | Facility: CLINIC | Age: 58
End: 2023-02-27

## 2023-02-27 VITALS
RESPIRATION RATE: 16 BRPM | BODY MASS INDEX: 23.43 KG/M2 | WEIGHT: 158.2 LBS | HEIGHT: 69 IN | SYSTOLIC BLOOD PRESSURE: 130 MMHG | HEART RATE: 70 BPM | DIASTOLIC BLOOD PRESSURE: 70 MMHG | OXYGEN SATURATION: 98 %

## 2023-02-27 DIAGNOSIS — N53.19 EJACULATORY DISORDER: ICD-10-CM

## 2023-02-27 DIAGNOSIS — R39.12 BENIGN PROSTATIC HYPERPLASIA WITH WEAK URINARY STREAM: Primary | ICD-10-CM

## 2023-02-27 DIAGNOSIS — N40.1 BENIGN PROSTATIC HYPERPLASIA WITH WEAK URINARY STREAM: Primary | ICD-10-CM

## 2023-02-27 DIAGNOSIS — N52.9 ERECTILE DYSFUNCTION, UNSPECIFIED ERECTILE DYSFUNCTION TYPE: ICD-10-CM

## 2023-02-27 DIAGNOSIS — Z12.5 PROSTATE CANCER SCREENING: ICD-10-CM

## 2023-02-27 LAB
POST-VOID RESIDUAL VOLUME, ML POC: 0 ML
SL AMB  POCT GLUCOSE, UA: NORMAL
SL AMB LEUKOCYTE ESTERASE,UA: NORMAL
SL AMB POCT BILIRUBIN,UA: NORMAL
SL AMB POCT BLOOD,UA: NORMAL
SL AMB POCT CLARITY,UA: CLEAR
SL AMB POCT COLOR,UA: YELLOW
SL AMB POCT KETONES,UA: NORMAL
SL AMB POCT NITRITE,UA: NORMAL
SL AMB POCT PH,UA: 5
SL AMB POCT SPECIFIC GRAVITY,UA: 1010
SL AMB POCT URINE PROTEIN: NORMAL
SL AMB POCT UROBILINOGEN: 0.2

## 2023-02-27 RX ORDER — SILODOSIN 8 MG/1
8 CAPSULE ORAL DAILY
Qty: 30 CAPSULE | Refills: 1 | Status: SHIPPED | OUTPATIENT
Start: 2023-02-27

## 2023-02-28 LAB
BACTERIA UR QL AUTO: ABNORMAL /HPF
BILIRUB UR QL STRIP: NEGATIVE
CLARITY UR: CLEAR
COLOR UR: ABNORMAL
GLUCOSE UR STRIP-MCNC: NEGATIVE MG/DL
HGB UR QL STRIP.AUTO: NEGATIVE
KETONES UR STRIP-MCNC: NEGATIVE MG/DL
LEUKOCYTE ESTERASE UR QL STRIP: ABNORMAL
NITRITE UR QL STRIP: NEGATIVE
NON-SQ EPI CELLS URNS QL MICRO: ABNORMAL /HPF
PH UR STRIP.AUTO: 6 [PH]
PROT UR STRIP-MCNC: NEGATIVE MG/DL
RBC #/AREA URNS AUTO: ABNORMAL /HPF
SP GR UR STRIP.AUTO: 1.01 (ref 1–1.03)
UROBILINOGEN UR STRIP-ACNC: <2 MG/DL
WBC #/AREA URNS AUTO: ABNORMAL /HPF

## 2023-03-01 ENCOUNTER — TELEPHONE (OUTPATIENT)
Dept: UROLOGY | Facility: CLINIC | Age: 58
End: 2023-03-01

## 2023-03-01 NOTE — TELEPHONE ENCOUNTER
Called patient regarding the recommendations of his cardiologist Jaja Perkins  Recommend on holding off on initiation of PDE 5 inhibitors until patient obtain stress test and is seen in follow-up with cardiology  Discussed this and explained to the patient    Plan for follow-up with urology as outlined in previous note

## 2023-03-02 LAB — BACTERIA UR CULT: NORMAL

## 2023-05-02 PROBLEM — Z71.2 ENCOUNTER TO DISCUSS TEST RESULTS: Status: ACTIVE | Noted: 2023-05-02

## 2023-05-02 PROBLEM — N40.1 BENIGN LOCALIZED PROSTATIC HYPERPLASIA WITH LOWER URINARY TRACT SYMPTOMS (LUTS): Status: ACTIVE | Noted: 2023-05-02

## 2023-12-26 NOTE — TELEPHONE ENCOUNTER
Spoke w/ pt   Pt has been scheduled for repeat colonoscopy w/ 2 day bowel prep (golytely /dulcolax per Dr Angel Reynolds) 7/8/2022 @ [facility] [place] no

## 2024-04-10 ENCOUNTER — TELEPHONE (OUTPATIENT)
Age: 59
End: 2024-04-10

## 2024-04-10 ENCOUNTER — TELEPHONE (OUTPATIENT)
Dept: GASTROENTEROLOGY | Facility: CLINIC | Age: 59
End: 2024-04-10

## 2024-04-10 DIAGNOSIS — N40.1 BENIGN PROSTATIC HYPERPLASIA WITH WEAK URINARY STREAM: ICD-10-CM

## 2024-04-10 DIAGNOSIS — R35.0 URINARY FREQUENCY: Primary | ICD-10-CM

## 2024-04-10 DIAGNOSIS — R39.12 BENIGN PROSTATIC HYPERPLASIA WITH WEAK URINARY STREAM: ICD-10-CM

## 2024-04-10 NOTE — TELEPHONE ENCOUNTER
Patient called today to schedule office visit.    Pt is scheduled on 4/18 at 10:40 AM in [place] with YAHAIRA Gaytan.    Patient called stating that his urinating symptoms are more frequent. Stating that when he's out in a store, he can't stand for too long without having to rush to the bathroom.    Please call patient to further discuss symptoms.    Call back [phone]

## 2024-04-10 NOTE — TELEPHONE ENCOUNTER
Spoke to patient and he has BPH history and was on Silodosin. He stopped taking that because he felt it wasn't helping. He is scheduled with a [facility] for 4/18/2024  that was scheduled today for urinary symptoms of urinary frequency, straining with voiding and voiding little amounts, pressure and split stream causing him to sit to void. He denies any painful urination and no hematuria. Denies any abdominal or penile pain and no pain in prostate area, no constipation. He states he drinks primarily coffee and beer with an occasional soda. I gave him care advice to hydrate well with water and avoid bladder irritants, placed urine orders to R/O infection and reviewed ER precautions. Please advise of any further recommendations and appointment date to be moved sooner if recommended.

## 2024-04-10 NOTE — TELEPHONE ENCOUNTER
Called pt and rescheduled appt next available cysto. Confirmed date, time, location    6/7    3pm    dr quiroz

## 2024-04-10 NOTE — TELEPHONE ENCOUNTER
Please assist in scheduling an in office cystoscopy per AP recommendations as per today's triaged phone call and AP last office note. Urine orders were placed today.

## 2024-06-07 ENCOUNTER — PROCEDURE VISIT (OUTPATIENT)
Dept: UROLOGY | Facility: CLINIC | Age: 59
End: 2024-06-07

## 2024-06-07 VITALS
HEART RATE: 105 BPM | OXYGEN SATURATION: 96 % | WEIGHT: 145 LBS | BODY MASS INDEX: 21.48 KG/M2 | DIASTOLIC BLOOD PRESSURE: 80 MMHG | SYSTOLIC BLOOD PRESSURE: 120 MMHG | HEIGHT: 69 IN

## 2024-06-07 DIAGNOSIS — N99.115 POSTPROCEDURAL MALE FOSSA NAVICULARIS URETHRAL STRICTURE: Primary | ICD-10-CM

## 2024-06-07 NOTE — PATIENT INSTRUCTIONS
We will arrange to do the incision or stretching of the urethra so you can urinate. I may need to leave the catheter in for a few days to make sure it does not scar down again.
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
## 2024-06-10 ENCOUNTER — TELEPHONE (OUTPATIENT)
Age: 59
End: 2024-06-10

## 2024-06-10 NOTE — TELEPHONE ENCOUNTER
Patient called today in attempt to schedule his procedure.    Pt was informed a message is being sent to SS.    Please review.    Call back [phone]

## 2024-06-10 NOTE — TELEPHONE ENCOUNTER
Pt calling to inquire about upcoming proced w/Dr. Dawson. [facility] for further assistance.

## 2024-06-13 ENCOUNTER — TELEPHONE (OUTPATIENT)
Dept: FAMILY MEDICINE CLINIC | Facility: CLINIC | Age: 59
End: 2024-06-13

## 2024-06-13 NOTE — TELEPHONE ENCOUNTER
Spoke with patient and confirmed surgery date of:6/26/24  Type of surgery:DIRECT VISUAL INTERAL URETHROTOMY (DVIU) (Urethra)   Operating physician: Dr. Dawson  Location of surgery: [facility]    Verbally went over prep with patient on:  6/13/24  NPO  Bowel prep? No  Hospital calls afternoon prior with arrival time -Calls Friday afternoon for Monday surgeries  Patient needs ride to and from surgery (outpatient/inpatient)   Pre-op testing to be done 2 weeks prior to surgery  Cbc bmp u/c ekg  Blood thinners:   Pat will call a week prior   Clearances needed: medical  Mailed/emailed to patient on: 6/13/24  Copy of packet scanned into Media on:6/13/24  Labs in packet  Soap / Bowel prep in packet  Pre-op & Post-op in packet  Dates of H&P and post-op if needed    Consent: in Media   If needed:    Medical/Cardiac Clearance: Medical patient is calling to schedule   Appt with:  Appt date and time:  Date clearance form faxed:  Best fax number:    Medication Suspension of: Medication Name / how many days  Ordering provider:  Faxed Medication Suspension form on:  Best fax number:    Kam/McCulloch:  Faxed form to pharmacy on:    RBC blood bank done on:    Rep info:  Emailed rep on date:

## 2024-06-13 NOTE — TELEPHONE ENCOUNTER
DR ZARATE    Patient needs a preop clear appt for surgery on June 26th.  I have no half hour appts with anyone.    Please advise.

## 2024-06-14 NOTE — PRE-PROCEDURE INSTRUCTIONS
No outpatient medications have been marked as taking for the 6/26/24 encounter (Hospital Encounter).    No medications Taken-Medication instructions for day surgery reviewed. Please use only a sip of water to take your instructed medications. Avoid all over the counter vitamins, supplements and NSAIDS for one week prior to surgery per anesthesia guidelines. Tylenol is ok to take as needed.     You will receive a call one business day prior to surgery with an arrival time and hospital directions. If your surgery is scheduled on a Monday, the hospital will be calling you on the Friday prior to your surgery. If you have not heard from anyone by 8pm, please call the hospital supervisor through the hospital  at [phone]. ([place] [phone] or [place] [phone]).    Do not eat or drink anything after midnight the night before your surgery, including candy, mints, lifesavers, or chewing gum. Do not drink alcohol 24hrs before your surgery. Try not to smoke at least 24hrs before your surgery.       Follow the pre surgery showering instructions as listed in the “My Surgical Experience Booklet” or otherwise provided by your surgeon's office. Do not use a blade to shave the surgical area 1 week before surgery. It is okay to use a clean electric clippers up to 24 hours before surgery. Do not apply any lotions, creams, including makeup, cologne, deodorant, or perfumes after showering on the day of your surgery. Do not use dry shampoo, hair spray, hair gel, or any type of hair products.     No contact lenses, eye make-up, or artificial eyelashes. Remove nail polish, including gel polish, and any artificial, gel, or acrylic nails if possible. Remove all jewelry including rings and body piercing jewelry.     Wear causal clothing that is easy to take on and off. Consider your type of surgery.    Keep any valuables, jewelry, piercings at home. Please bring any specially ordered equipment (sling, braces) if  indicated.    Arrange for a responsible person to drive you to and from the hospital on the day of your surgery. Please confirm the visitor policy for the day of your procedure when you receive your phone call with an arrival time.     Call the surgeon's office with any new illnesses, exposures, or additional questions prior to surgery.    Please reference your “My Surgical Experience Booklet” for additional information to prepare for your upcoming surgery.

## 2024-06-17 ENCOUNTER — APPOINTMENT (OUTPATIENT)
Dept: LAB | Facility: HOSPITAL | Age: 59
End: 2024-06-17
Payer: COMMERCIAL

## 2024-06-17 DIAGNOSIS — N40.1 BENIGN PROSTATIC HYPERPLASIA WITH WEAK URINARY STREAM: ICD-10-CM

## 2024-06-17 DIAGNOSIS — Z01.812 PRE-OPERATIVE LABORATORY EXAMINATION: ICD-10-CM

## 2024-06-17 DIAGNOSIS — R39.12 BENIGN PROSTATIC HYPERPLASIA WITH WEAK URINARY STREAM: ICD-10-CM

## 2024-06-17 DIAGNOSIS — R39.89 SUSPECTED UTI: ICD-10-CM

## 2024-06-17 DIAGNOSIS — N99.115 POSTPROCEDURAL MALE FOSSA NAVICULARIS URETHRAL STRICTURE: ICD-10-CM

## 2024-06-17 DIAGNOSIS — R35.0 URINARY FREQUENCY: ICD-10-CM

## 2024-06-17 DIAGNOSIS — Z01.810 PRE-OPERATIVE CARDIOVASCULAR EXAMINATION: ICD-10-CM

## 2024-06-17 LAB
ANION GAP SERPL CALCULATED.3IONS-SCNC: 7 MMOL/L (ref 4–13)
BACTERIA UR QL AUTO: ABNORMAL /HPF
BASOPHILS # BLD AUTO: 0.06 THOUSANDS/ÂΜL (ref 0–0.1)
BASOPHILS NFR BLD AUTO: 1 % (ref 0–1)
BILIRUB UR QL STRIP: NEGATIVE
BUN SERPL-MCNC: 11 MG/DL (ref 5–25)
CALCIUM SERPL-MCNC: 8.6 MG/DL (ref 8.4–10.2)
CHLORIDE SERPL-SCNC: 106 MMOL/L (ref 96–108)
CLARITY UR: CLEAR
CO2 SERPL-SCNC: 25 MMOL/L (ref 21–32)
COLOR UR: YELLOW
CREAT SERPL-MCNC: 0.62 MG/DL (ref 0.6–1.3)
EOSINOPHIL # BLD AUTO: 0.15 THOUSAND/ÂΜL (ref 0–0.61)
EOSINOPHIL NFR BLD AUTO: 3 % (ref 0–6)
ERYTHROCYTE [DISTWIDTH] IN BLOOD BY AUTOMATED COUNT: 13.4 % (ref 11.6–15.1)
GFR SERPL CREATININE-BSD FRML MDRD: 107 ML/MIN/1.73SQ M
GLUCOSE P FAST SERPL-MCNC: 94 MG/DL (ref 65–99)
GLUCOSE UR STRIP-MCNC: NEGATIVE MG/DL
HCT VFR BLD AUTO: 41.5 % (ref 36.5–49.3)
HGB BLD-MCNC: 13.7 G/DL (ref 12–17)
HGB UR QL STRIP.AUTO: NEGATIVE
IMM GRANULOCYTES # BLD AUTO: 0.01 THOUSAND/UL (ref 0–0.2)
IMM GRANULOCYTES NFR BLD AUTO: 0 % (ref 0–2)
KETONES UR STRIP-MCNC: NEGATIVE MG/DL
LEUKOCYTE ESTERASE UR QL STRIP: ABNORMAL
LYMPHOCYTES # BLD AUTO: 1.76 THOUSANDS/ÂΜL (ref 0.6–4.47)
LYMPHOCYTES NFR BLD AUTO: 31 % (ref 14–44)
MCH RBC QN AUTO: 33.3 PG (ref 26.8–34.3)
MCHC RBC AUTO-ENTMCNC: 33 G/DL (ref 31.4–37.4)
MCV RBC AUTO: 101 FL (ref 82–98)
MONOCYTES # BLD AUTO: 0.5 THOUSAND/ÂΜL (ref 0.17–1.22)
MONOCYTES NFR BLD AUTO: 9 % (ref 4–12)
NEUTROPHILS # BLD AUTO: 3.22 THOUSANDS/ÂΜL (ref 1.85–7.62)
NEUTS SEG NFR BLD AUTO: 56 % (ref 43–75)
NITRITE UR QL STRIP: NEGATIVE
NON-SQ EPI CELLS URNS QL MICRO: ABNORMAL /HPF
NRBC BLD AUTO-RTO: 0 /100 WBCS
PH UR STRIP.AUTO: 6.5 [PH]
PLATELET # BLD AUTO: 209 THOUSANDS/UL (ref 149–390)
PMV BLD AUTO: 9.7 FL (ref 8.9–12.7)
POTASSIUM SERPL-SCNC: 4.1 MMOL/L (ref 3.5–5.3)
PROT UR STRIP-MCNC: NEGATIVE MG/DL
RBC # BLD AUTO: 4.11 MILLION/UL (ref 3.88–5.62)
RBC #/AREA URNS AUTO: ABNORMAL /HPF
SODIUM SERPL-SCNC: 138 MMOL/L (ref 135–147)
SP GR UR STRIP.AUTO: 1.01 (ref 1–1.03)
UROBILINOGEN UR STRIP-ACNC: <2 MG/DL
WBC # BLD AUTO: 5.7 THOUSAND/UL (ref 4.31–10.16)
WBC #/AREA URNS AUTO: ABNORMAL /HPF

## 2024-06-17 PROCEDURE — 80048 BASIC METABOLIC PNL TOTAL CA: CPT

## 2024-06-17 PROCEDURE — 85025 COMPLETE CBC W/AUTO DIFF WBC: CPT

## 2024-06-17 PROCEDURE — 87086 URINE CULTURE/COLONY COUNT: CPT

## 2024-06-17 PROCEDURE — 36415 COLL VENOUS BLD VENIPUNCTURE: CPT

## 2024-06-17 PROCEDURE — 81001 URINALYSIS AUTO W/SCOPE: CPT

## 2024-06-18 NOTE — TELEPHONE ENCOUNTER
Patient called today to say that he's had his PAT done and wanted to know if he still needed to keep his surgical clearance appt with his PCP.    I informed the patient that yes, he must still keep that appointment in order for his PCP to medically clear him for surgery and to sign any clearance forms.    Pt verbalized understanding and will keep his 6/20 appointment with PCP.     No further action is needed at this time.

## 2024-06-19 LAB
ATRIAL RATE: 65 BPM
BACTERIA UR CULT: NORMAL
P AXIS: 67 DEGREES
PR INTERVAL: 132 MS
QRS AXIS: 20 DEGREES
QRSD INTERVAL: 96 MS
QT INTERVAL: 398 MS
QTC INTERVAL: 413 MS
T WAVE AXIS: 53 DEGREES
VENTRICULAR RATE: 65 BPM

## 2024-06-19 PROCEDURE — 93010 ELECTROCARDIOGRAM REPORT: CPT | Performed by: INTERNAL MEDICINE

## 2024-06-20 ENCOUNTER — OFFICE VISIT (OUTPATIENT)
Dept: FAMILY MEDICINE CLINIC | Facility: CLINIC | Age: 59
End: 2024-06-20
Payer: COMMERCIAL

## 2024-06-20 VITALS
RESPIRATION RATE: 16 BRPM | TEMPERATURE: 97 F | SYSTOLIC BLOOD PRESSURE: 128 MMHG | WEIGHT: 150 LBS | BODY MASS INDEX: 22.22 KG/M2 | HEIGHT: 69 IN | HEART RATE: 80 BPM | DIASTOLIC BLOOD PRESSURE: 78 MMHG

## 2024-06-20 DIAGNOSIS — Z01.818 PREOP EXAMINATION: Primary | ICD-10-CM

## 2024-06-20 PROBLEM — Z71.2 ENCOUNTER TO DISCUSS TEST RESULTS: Status: RESOLVED | Noted: 2023-05-02 | Resolved: 2024-06-20

## 2024-06-20 PROBLEM — J44.9 CHRONIC OBSTRUCTIVE PULMONARY DISEASE (HCC): Status: RESOLVED | Noted: 2022-04-29 | Resolved: 2024-06-20

## 2024-06-20 PROCEDURE — 99214 OFFICE O/P EST MOD 30 MIN: CPT | Performed by: FAMILY MEDICINE

## 2024-06-20 NOTE — PROGRESS NOTES
[facility] PRE-OPERATIVE EVALUATION  [facility]    NAME: Navin Wise  AGE: 58 y.o. SEX: male  : 1965     DATE: 2024    [facility] Pre-Operative Evaluation      Chief Complaint: Pre-operative Evaluation     Surgery: DIRECT VISUAL INTERAL URETHROTOMY (DVIU) (Urethra)   Anticipated Date of Surgery: 24  Surgeon: Dr. Dawson     Assessment & Recommendations:     Pre-Op Evaluation Plan  1. Further preoperative workup as follows:   - None; no further preoperative work-up is required    2. Medication Management/Recommendations:   - None, continue medication regimen including morning of surgery, with sip of water    3. Patient requires further consultation with: None    Clearance  Patient is CLEARED for surgery without any additional cardiac testing.       Problem List Items Addressed This Visit    None  Visit Diagnoses       Preop examination    -  Primary            No follow-ups on file.    History of Present Illness:     HPI    Anesthesia:  IV sedation  Bleeding Risk: no recent abnormal bleeding  Current Anti-platelet/anticoagulation medication: none      Prior Anesthesia Reactions: No     Personal history of venous thromboembolic disease? No         Review of Systems:     Review of Systems   Constitutional: Negative.    HENT: Negative.     Eyes: Negative.    Respiratory: Negative.     Cardiovascular: Negative.    Gastrointestinal: Negative.    Endocrine: Negative.    Genitourinary: Negative.    Musculoskeletal: Negative.    Neurological: Negative.    Hematological: Negative.    Psychiatric/Behavioral: Negative.         Current Problem List:     Patient Active Problem List   Diagnosis    Alcohol abuse    TIA (transient ischemic attack)    Dyslipidemia    Benign localized prostatic hyperplasia with lower urinary tract symptoms (LUTS)       Allergies:     No Known Allergies    Current Medications:     No current outpatient medications on  "file.    Past Medical History:       Past Medical History:   Diagnosis Date    Broken teeth     upper and lower    COPD (chronic obstructive pulmonary disease) (HCC)     Hx of TIA (transient ischemic attack) and stroke     Seizures (HCC) 2013    last seizure years ago    Urinary anomaly     \"sprays\"all over, slow stream    Wears glasses         Past Surgical History:   Procedure Laterality Date    COLONOSCOPY      NO PAST SURGERIES          History reviewed. No pertinent family history.     Social History     Socioeconomic History    Marital status: Single     Spouse name: Not on file    Number of children: Not on file    Years of education: Not on file    Highest education level: Not on file   Occupational History    Not on file   Tobacco Use    Smoking status: Former     Current packs/day: 0.00     Average packs/day: 2.0 packs/day for 41.0 years (82.0 ttl pk-yrs)     Types: Cigarettes     Start date: 1971     Quit date: 2012     Years since quittin.4     Passive exposure: Past    Smokeless tobacco: Never   Vaping Use    Vaping status: Never Used   Substance and Sexual Activity    Alcohol use: Yes     Alcohol/week: 8.0 standard drinks of alcohol     Types: 8 Cans of beer per week    Drug use: Yes     Types: Marijuana     Comment: occ    Sexual activity: Not on file   Other Topics Concern    Not on file   Social History Narrative    Not on file     Social Determinants of Health     Financial Resource Strain: Not on file   Food Insecurity: Not on file   Transportation Needs: Not on file   Physical Activity: Not on file   Stress: Not on file   Social Connections: Not on file   Intimate Partner Violence: Not on file   Housing Stability: Not on file        Physical Exam:     /78   Pulse 80   Temp (!) 97 °F (36.1 °C) (Temporal)   Resp 16   Ht 5' 9\" (1.753 m)   Wt 68 kg (150 lb)   BMI 22.15 kg/m²     Physical Exam  Constitutional:       General: He is not in acute distress.     Appearance: He is " well-developed. He is not diaphoretic.   HENT:      Head: Normocephalic and atraumatic.      Right Ear: Hearing, tympanic membrane, ear canal and external ear normal.      Left Ear: Hearing, tympanic membrane, ear canal and external ear normal.      Nose: Nose normal.      Mouth/Throat:      Mouth: Oropharynx is clear and moist.      Pharynx: No oropharyngeal exudate.   Eyes:      General: No scleral icterus.        Right eye: No discharge.         Left eye: No discharge.      Extraocular Movements: EOM normal.      Conjunctiva/sclera: Conjunctivae normal.      Pupils: Pupils are equal, round, and reactive to light.   Neck:      Thyroid: No thyromegaly.      Trachea: No tracheal deviation.   Cardiovascular:      Rate and Rhythm: Normal rate and regular rhythm.      Heart sounds: Normal heart sounds. No murmur heard.     No friction rub. No gallop.   Pulmonary:      Effort: Pulmonary effort is normal. No respiratory distress.      Breath sounds: Normal breath sounds. No wheezing or rales.   Chest:      Chest wall: No tenderness.   Abdominal:      General: Bowel sounds are normal. There is no distension.      Palpations: Abdomen is soft. There is no mass.      Tenderness: There is no abdominal tenderness. There is no guarding or rebound.   Musculoskeletal:         General: No tenderness, deformity or edema. Normal range of motion.      Cervical back: Normal range of motion and neck supple.   Skin:     General: Skin is warm and dry.      Coloration: Skin is not pale.      Findings: No erythema or rash.   Neurological:      Mental Status: He is alert and oriented to person, place, and time.      Motor: No abnormal muscle tone.      Coordination: Coordination normal.      Deep Tendon Reflexes: Reflexes normal.   Psychiatric:         Mood and Affect: Mood and affect normal.         Behavior: Behavior normal.         Thought Content: Thought content normal.         Judgment: Judgment normal.          Data:      Pre-operative work-up    Laboratory Results: I have personally reviewed the pertinent laboratory results/reports      EKG: I have personally reviewed pertinent reports.      Recent Results (from the past 672 hour(s))   CBC and differential    Collection Time: 06/17/24 10:57 AM   Result Value Ref Range    WBC 5.70 4.31 - 10.16 Thousand/uL    RBC 4.11 3.88 - 5.62 Million/uL    Hemoglobin 13.7 12.0 - 17.0 g/dL    Hematocrit 41.5 36.5 - 49.3 %     (H) 82 - 98 fL    MCH 33.3 26.8 - 34.3 pg    MCHC 33.0 31.4 - 37.4 g/dL    RDW 13.4 11.6 - 15.1 %    MPV 9.7 8.9 - 12.7 fL    Platelets 209 149 - 390 Thousands/uL    nRBC 0 /100 WBCs    Segmented % 56 43 - 75 %    Immature Grans % 0 0 - 2 %    Lymphocytes % 31 14 - 44 %    Monocytes % 9 4 - 12 %    Eosinophils Relative 3 0 - 6 %    Basophils Relative 1 0 - 1 %    Absolute Neutrophils 3.22 1.85 - 7.62 Thousands/µL    Absolute Immature Grans 0.01 0.00 - 0.20 Thousand/uL    Absolute Lymphocytes 1.76 0.60 - 4.47 Thousands/µL    Absolute Monocytes 0.50 0.17 - 1.22 Thousand/µL    Eosinophils Absolute 0.15 0.00 - 0.61 Thousand/µL    Basophils Absolute 0.06 0.00 - 0.10 Thousands/µL   Basic metabolic panel    Collection Time: 06/17/24 10:57 AM   Result Value Ref Range    Sodium 138 135 - 147 mmol/L    Potassium 4.1 3.5 - 5.3 mmol/L    Chloride 106 96 - 108 mmol/L    CO2 25 21 - 32 mmol/L    ANION GAP 7 4 - 13 mmol/L    BUN 11 5 - 25 mg/dL    Creatinine 0.62 0.60 - 1.30 mg/dL    Glucose, Fasting 94 65 - 99 mg/dL    Calcium 8.6 8.4 - 10.2 mg/dL    eGFR 107 ml/min/1.73sq m   Urine culture    Collection Time: 06/17/24 10:57 AM    Specimen: Urine, Clean Catch   Result Value Ref Range    Urine Culture 20,000-29,000 cfu/ml    Urinalysis with microscopic    Collection Time: 06/17/24 10:57 AM   Result Value Ref Range    Color, UA Yellow     Clarity, UA Clear     Specific Gravity, UA 1.015 1.005 - 1.030    pH, UA 6.5 4.5, 5.0, 5.5, 6.0, 6.5, 7.0, 7.5, 8.0    Leukocytes, UA Small (A)  Negative    Nitrite, UA Negative Negative    Protein, UA Negative Negative mg/dl    Glucose, UA Negative Negative mg/dl    Ketones, UA Negative Negative mg/dl    Urobilinogen, UA <2.0 <2.0 mg/dl mg/dl    Bilirubin, UA Negative Negative    Occult Blood, UA Negative Negative    RBC, UA None Seen None Seen, 2-4 /hpf    WBC, UA 0-5 (A) None Seen, 2-4, 5-60 /hpf    Epithelial Cells Occasional None Seen, Occasional /hpf    Bacteria, UA Occasional None Seen, Occasional /hpf   EKG 12 lead    Collection Time: 06/17/24 11:04 AM   Result Value Ref Range    Ventricular Rate 65 BPM    Atrial Rate 65 BPM    ME Interval 132 ms    QRSD Interval 96 ms    QT Interval 398 ms    QTC Interval 413 ms    P [place] 67 degrees    QRS Axis 20 degrees    T Wave Axis 53 degrees       Melva Velasquez MD  Located within [facility]  [address] [address]  Phone#  [phone]  Fax#  [phone]

## 2024-06-20 NOTE — LETTER
June 20, 2024     Patient: Navin Wise  YOB: 1965  Date of Visit: 6/20/2024      To Whom it May Concern:    Navin Wise is under my professional care. Navin was seen in my office on 6/20/2024. Navin may return to work on 6/21/24 .    If you have any questions or concerns, please don't hesitate to call.         Sincerely,          Melva Velasquez MD

## 2024-06-24 ENCOUNTER — ANESTHESIA EVENT (OUTPATIENT)
Dept: PERIOP | Facility: HOSPITAL | Age: 59
End: 2024-06-24
Payer: COMMERCIAL

## 2024-06-24 PROBLEM — Z87.898 HISTORY OF SEIZURES: Status: ACTIVE | Noted: 2024-06-24

## 2024-06-24 NOTE — ANESTHESIA PREPROCEDURE EVALUATION
Procedure:  DIRECT VISUAL INTERAL URETHROTOMY (DVIU) (Urethra)    Relevant Problems   /RENAL   (+) Benign localized prostatic hyperplasia with lower urinary tract symptoms (LUTS)      NEURO/PSYCH   (+) TIA (transient ischemic attack)      PULMONARY   (+) Chronic obstructive pulmonary disease (HCC)      Behavioral Health   (+) Marijuana smoker      Other   (+) History of seizures        Physical Exam    Airway    Mallampati score: III  TM Distance: >3 FB  Neck ROM: full     Dental       Cardiovascular  Rhythm: regular, Rate: normal    Pulmonary   Breath sounds clear to auscultation    Other Findings        Anesthesia Plan  ASA Score- 2     Anesthesia Type- IV sedation with anesthesia with ASA Monitors.         Additional Monitors:     Airway Plan:            Plan Factors-    Chart reviewed.        Patient is not a current smoker.              Induction- intravenous.    Postoperative Plan- Plan for postoperative opioid use.     Perioperative Resuscitation Plan - Level 1 - Full Code.       Informed Consent- Anesthetic plan and risks discussed with patient.  I personally reviewed this patient with the CRNA. Discussed and agreed on the Anesthesia Plan with the CRNA..

## 2024-06-26 ENCOUNTER — TELEPHONE (OUTPATIENT)
Dept: UROLOGY | Facility: CLINIC | Age: 59
End: 2024-06-26

## 2024-06-26 ENCOUNTER — HOSPITAL ENCOUNTER (OUTPATIENT)
Facility: HOSPITAL | Age: 59
Setting detail: OUTPATIENT SURGERY
Discharge: HOME | End: 2024-06-26
Attending: UROLOGY | Admitting: UROLOGY
Payer: COMMERCIAL

## 2024-06-26 ENCOUNTER — ANESTHESIA (OUTPATIENT)
Dept: PERIOP | Facility: HOSPITAL | Age: 59
End: 2024-06-26
Payer: COMMERCIAL

## 2024-06-26 VITALS
BODY MASS INDEX: 21.84 KG/M2 | OXYGEN SATURATION: 96 % | DIASTOLIC BLOOD PRESSURE: 86 MMHG | HEIGHT: 69 IN | TEMPERATURE: 98.2 F | RESPIRATION RATE: 20 BRPM | HEART RATE: 61 BPM | WEIGHT: 147.49 LBS | SYSTOLIC BLOOD PRESSURE: 142 MMHG

## 2024-06-26 PROBLEM — N99.115 POSTPROCEDURAL MALE FOSSA NAVICULARIS URETHRAL STRICTURE: Status: ACTIVE | Noted: 2024-06-26

## 2024-06-26 PROBLEM — F12.90 MARIJUANA SMOKER: Status: ACTIVE | Noted: 2024-06-26

## 2024-06-26 LAB
ANION GAP SERPL CALCULATED.3IONS-SCNC: 6 MMOL/L (ref 4–13)
BUN SERPL-MCNC: 12 MG/DL (ref 5–25)
CALCIUM SERPL-MCNC: 8.7 MG/DL (ref 8.4–10.2)
CHLORIDE SERPL-SCNC: 109 MMOL/L (ref 96–108)
CO2 SERPL-SCNC: 23 MMOL/L (ref 21–32)
CREAT SERPL-MCNC: 0.59 MG/DL (ref 0.6–1.3)
GFR SERPL CREATININE-BSD FRML MDRD: 110 ML/MIN/1.73SQ M
GLUCOSE P FAST SERPL-MCNC: 94 MG/DL (ref 65–99)
GLUCOSE SERPL-MCNC: 94 MG/DL (ref 65–140)
POTASSIUM SERPL-SCNC: 3.9 MMOL/L (ref 3.5–5.3)
SODIUM SERPL-SCNC: 138 MMOL/L (ref 135–147)

## 2024-06-26 PROCEDURE — NC001 PR NO CHARGE: Performed by: UROLOGY

## 2024-06-26 PROCEDURE — 52281 CYSTOSCOPY AND TREATMENT: CPT | Performed by: UROLOGY

## 2024-06-26 PROCEDURE — 80048 BASIC METABOLIC PNL TOTAL CA: CPT | Performed by: UROLOGY

## 2024-06-26 PROCEDURE — C1769 GUIDE WIRE: HCPCS | Performed by: UROLOGY

## 2024-06-26 RX ORDER — MAGNESIUM HYDROXIDE 1200 MG/15ML
LIQUID ORAL AS NEEDED
Status: DISCONTINUED | OUTPATIENT
Start: 2024-06-26 | End: 2024-06-26 | Stop reason: HOSPADM

## 2024-06-26 RX ORDER — PROPOFOL 10 MG/ML
INJECTION, EMULSION INTRAVENOUS AS NEEDED
Status: DISCONTINUED | OUTPATIENT
Start: 2024-06-26 | End: 2024-06-26 | Stop reason: HOSPADM

## 2024-06-26 RX ORDER — FENTANYL CITRATE 50 UG/ML
INJECTION, SOLUTION INTRAMUSCULAR; INTRAVENOUS AS NEEDED
Status: DISCONTINUED | OUTPATIENT
Start: 2024-06-26 | End: 2024-06-26 | Stop reason: HOSPADM

## 2024-06-26 RX ORDER — MIDAZOLAM HYDROCHLORIDE 2 MG/2ML
INJECTION, SOLUTION INTRAMUSCULAR; INTRAVENOUS AS NEEDED
Status: DISCONTINUED | OUTPATIENT
Start: 2024-06-26 | End: 2024-06-26 | Stop reason: HOSPADM

## 2024-06-26 RX ORDER — SODIUM CHLORIDE, SODIUM LACTATE, POTASSIUM CHLORIDE, CALCIUM CHLORIDE 600; 310; 30; 20 MG/100ML; MG/100ML; MG/100ML; MG/100ML
INJECTION, SOLUTION INTRAVENOUS CONTINUOUS PRN
Status: DISCONTINUED | OUTPATIENT
Start: 2024-06-26 | End: 2024-06-26 | Stop reason: HOSPADM

## 2024-06-26 RX ORDER — METOCLOPRAMIDE HYDROCHLORIDE 5 MG/ML
INJECTION INTRAMUSCULAR; INTRAVENOUS AS NEEDED
Status: DISCONTINUED | OUTPATIENT
Start: 2024-06-26 | End: 2024-06-26 | Stop reason: HOSPADM

## 2024-06-26 RX ORDER — SODIUM CHLORIDE, SODIUM LACTATE, POTASSIUM CHLORIDE, CALCIUM CHLORIDE 600; 310; 30; 20 MG/100ML; MG/100ML; MG/100ML; MG/100ML
75 INJECTION, SOLUTION INTRAVENOUS CONTINUOUS
Status: DISCONTINUED | OUTPATIENT
Start: 2024-06-26 | End: 2024-06-26 | Stop reason: HOSPADM

## 2024-06-26 RX ORDER — PROPOFOL 10 MG/ML
INJECTION, EMULSION INTRAVENOUS CONTINUOUS PRN
Status: DISCONTINUED | OUTPATIENT
Start: 2024-06-26 | End: 2024-06-26 | Stop reason: HOSPADM

## 2024-06-26 RX ORDER — LIDOCAINE HYDROCHLORIDE 10 MG/ML
INJECTION, SOLUTION EPIDURAL; INFILTRATION; INTRACAUDAL; PERINEURAL AS NEEDED
Status: DISCONTINUED | OUTPATIENT
Start: 2024-06-26 | End: 2024-06-26 | Stop reason: HOSPADM

## 2024-06-26 RX ORDER — CEFAZOLIN SODIUM 1 G/50ML
SOLUTION INTRAVENOUS AS NEEDED
Status: DISCONTINUED | OUTPATIENT
Start: 2024-06-26 | End: 2024-06-26 | Stop reason: HOSPADM

## 2024-06-26 RX ORDER — ONDANSETRON 2 MG/ML
INJECTION INTRAMUSCULAR; INTRAVENOUS AS NEEDED
Status: DISCONTINUED | OUTPATIENT
Start: 2024-06-26 | End: 2024-06-26 | Stop reason: HOSPADM

## 2024-06-26 RX ORDER — ONDANSETRON 2 MG/ML
4 INJECTION INTRAMUSCULAR; INTRAVENOUS ONCE AS NEEDED
Status: CANCELLED | OUTPATIENT
Start: 2024-06-26

## 2024-06-26 RX ORDER — FENTANYL CITRATE/PF 50 MCG/ML
25 SYRINGE (ML) INJECTION
Status: CANCELLED | OUTPATIENT
Start: 2024-06-26

## 2024-06-26 RX ADMIN — PROPOFOL 50 MG: 10 INJECTION, EMULSION INTRAVENOUS at 11:06

## 2024-06-26 RX ADMIN — ONDANSETRON 4 MG: 2 INJECTION INTRAMUSCULAR; INTRAVENOUS at 11:05

## 2024-06-26 RX ADMIN — PROPOFOL 150 MCG/KG/MIN: 10 INJECTION, EMULSION INTRAVENOUS at 11:09

## 2024-06-26 RX ADMIN — LIDOCAINE HYDROCHLORIDE 50 MG: 10 INJECTION, SOLUTION EPIDURAL; INFILTRATION; INTRACAUDAL; PERINEURAL at 11:06

## 2024-06-26 RX ADMIN — FENTANYL CITRATE 50 MCG: 50 INJECTION, SOLUTION INTRAMUSCULAR; INTRAVENOUS at 11:12

## 2024-06-26 RX ADMIN — MIDAZOLAM 2 MG: 1 INJECTION INTRAMUSCULAR; INTRAVENOUS at 11:05

## 2024-06-26 RX ADMIN — METOCLOPRAMIDE 10 MG: 5 INJECTION, SOLUTION INTRAMUSCULAR; INTRAVENOUS at 11:05

## 2024-06-26 RX ADMIN — PROPOFOL 150 MCG/KG/MIN: 10 INJECTION, EMULSION INTRAVENOUS at 11:12

## 2024-06-26 RX ADMIN — FENTANYL CITRATE 50 MCG: 50 INJECTION, SOLUTION INTRAMUSCULAR; INTRAVENOUS at 11:18

## 2024-06-26 RX ADMIN — CEFAZOLIN SODIUM 1000 MG: 1 SOLUTION INTRAVENOUS at 11:17

## 2024-06-26 RX ADMIN — SODIUM CHLORIDE, SODIUM LACTATE, POTASSIUM CHLORIDE, AND CALCIUM CHLORIDE 75 ML/HR: .6; .31; .03; .02 INJECTION, SOLUTION INTRAVENOUS at 08:58

## 2024-06-26 RX ADMIN — PROPOFOL 50 MG: 10 INJECTION, EMULSION INTRAVENOUS at 11:23

## 2024-06-26 RX ADMIN — SODIUM CHLORIDE, SODIUM LACTATE, POTASSIUM CHLORIDE, AND CALCIUM CHLORIDE: .6; .31; .03; .02 INJECTION, SOLUTION INTRAVENOUS at 10:43

## 2024-06-26 RX ADMIN — PROPOFOL 50 MG: 10 INJECTION, EMULSION INTRAVENOUS at 11:19

## 2024-06-26 NOTE — OP NOTE
OPERATIVE REPORT- Dr. Dawson  PATIENT NAME: Navin Wise    :  1965  MRN: [id number]  Pt Location: [place] OR ROOM 04    SURGERY DATE: 2024    Surgeon: Stewart Dawson MD    Pre-op Diagnosis:  Voiding dysfunction  Fossa navicularis/meatal stricture    Post-op Diagnosis:  Same    Procedure:  Cystoscopy with urethral meatal    Specimen(s): * No specimens in log *    Estimated Blood Loss: Minimal    Complications: None    Drains: 16 [place] catheter    Anesthesia type: Sedation    Indications for surgery: Patient with difficulty urinating.  Cystoscopy in the office not able to be accomplished due to the narrowing at the meatus    Findings: Pinpoint narrowing at the tip of the penis probably extending several millimeters inside.  A lot of squamous change within the entire length of the urethra, typical of obstruction distally.  No significant BPH.  No stones or tumors.  Mild trabeculation of the bladder wall    Procedure and Technique:   After obtaining consent and identifying the patient, antibiotics were given as ordered and the patient was brought to the room.  All appropriate leads and monitors were placed and the patient was appropriately positioned on the table.  Anesthesia was administered and the patient was sterilely prepped and draped.  A timeout was performed where the patient name, , procedure, antibiotics, allergies, etc. were discussed.  All in the room were satisfied before the start of the operative procedure.  What follows are the operative findings and events.     A guidewire was passed per pinpoint meatus easily up towards the bladder.  Had no fluoroscopy.  Urethral dilation was performed with female sounds starting at 10 U[place]ayan.  Was gradually able to dilate up to 22 [place]ayan alongside the guidewire.  17 [place]ruguayan scope was then advanced alongside the wire.  Cystoscopy was performed.  Findings noted above.  16 [place] catheter was then placed over the wire.  The procedure was  "terminated and the patient was awakened without incident and transferred to the PACU in satisfactory condition.    Plan:  1.  Ventura catheter should stay on for couple of days and then be removed.  We will try to get a nurse visit set up for him to have it removed.  Should have cystoscopy performed in 6 weeks.    SIGNATURE: Stewart Dawson MD  DATE: June 26, 2024  TIME: 11:39 AM    Portions of the record may have been created with voice recognition software.  Occasional wrong word or \"sound alike\" substitutions may have occurred due to the inherent limitations of voice recognition software.  Read the chart carefully and recognize, using context, where substitutions have occurred.  "

## 2024-06-26 NOTE — ANESTHESIA POSTPROCEDURE EVALUATION
Post-Op Assessment Note    CV Status:  Stable  Pain Score: 0    Pain management: adequate       Mental Status:  Sleepy   PONV Controlled:  None   Airway Patency:  Patent     Post Op Vitals Reviewed: Yes    No anethesia notable event occurred.    Staff: CRNA               BP   102/65   Temp   97.3   Pulse  64   Resp   16   SpO2   96

## 2024-06-26 NOTE — H&P
"History & Physical - [facility]  Navin Wise 58 y.o. male MRN: [id number]  Unit/Bed#: OR [place] Encounter: [id number]    ASSESSMENT  Urethral meatal stricture    PLAN:  Cystoscopy with urethral dilation possible DVIU.    HISTORY OF PRESENT ILLNESS:  Patient with difficult urination.  Cystoscopy was not able to be completed in the office due to urethral stricture.    PAST MEDICAL HISTORY:  Past Medical History:   Diagnosis Date    Broken teeth     upper and lower    COPD (chronic obstructive pulmonary disease) ([facility])     Hx of TIA (transient ischemic attack) and stroke     Seizures ([facility]) 2013    last seizure years ago    Urinary anomaly     \"sprays\"all over, slow stream    Wears glasses        PAST SURGICAL HISTORY:  Past Surgical History:   Procedure Laterality Date    COLONOSCOPY      NO PAST SURGERIES         ALLERGIES:  No Known Allergies    SOCIAL HISTORY:  Social History     Substance and Sexual Activity   Alcohol Use Yes    Alcohol/week: 8.0 standard drinks of alcohol    Types: 8 Cans of beer per week    Comment: everyday     Social History     Substance and Sexual Activity   Drug Use Yes    Types: Marijuana    Comment: occ     Social History     Tobacco Use   Smoking Status Former    Current packs/day: 0.00    Average packs/day: 2.0 packs/day for 41.0 years (82.0 ttl pk-yrs)    Types: Cigarettes    Start date: 1971    Quit date: 2012    Years since quittin.4    Passive exposure: Past   Smokeless Tobacco Never       FAMILY HISTORY:  History reviewed. No pertinent family history.    MEDICATIONS:    Current Facility-Administered Medications:     lactated ringers infusion, 75 mL/hr, Intravenous, Continuous, Sherwin Lantigua MD, Last Rate: 75 mL/hr at 24 0858, 75 mL/hr at 24 0858    Facility-Administered Medications Ordered in Other Encounters:     lactated ringers infusion, , Intravenous, Continuous PRN, Harleen Horn CRNA, New Bag at 24 1043    Review " of Systems   Constitutional:  Negative for chills and fever.   HENT:  Negative for ear pain and sore throat.    Eyes:  Negative for pain and visual disturbance.   Respiratory:  Negative for cough and shortness of breath.    Cardiovascular:  Negative for chest pain and palpitations.   Gastrointestinal:  Negative for abdominal pain and vomiting.   Genitourinary:  Positive for difficulty urinating and dysuria. Negative for hematuria.   Musculoskeletal:  Negative for arthralgias and back pain.   Skin:  Negative for color change and rash.   Neurological:  Negative for seizures and syncope.   All other systems reviewed and are negative.      PHYSICAL EXAM:  Physical Exam  Constitutional:       General: He is not in acute distress.     Appearance: He is well-developed. He is not diaphoretic.   HENT:      Head: Normocephalic and atraumatic.      Mouth/Throat:      Pharynx: No oropharyngeal exudate.   Eyes:      General: No scleral icterus.  Neck:      Vascular: No JVD.   Cardiovascular:      Rate and Rhythm: Normal rate and regular rhythm.      Heart sounds: No murmur heard.  Pulmonary:      Effort: Pulmonary effort is normal. No respiratory distress.      Breath sounds: Normal breath sounds. No wheezing.   Abdominal:      General: There is no distension.      Palpations: Abdomen is soft.      Tenderness: There is no abdominal tenderness. There is no rebound.   Genitourinary:     Comments: Meatus narrowed.  Musculoskeletal:      Cervical back: Normal range of motion and neck supple.   Skin:     General: Skin is warm and dry.   Neurological:      Mental Status: He is alert and oriented to person, place, and time.   Psychiatric:         Behavior: Behavior normal.         LAB RESULTS:  Lab Results   Component Value Date    WBC 5.70 06/17/2024    HGB 13.7 06/17/2024    HCT 41.5 06/17/2024     (H) 06/17/2024     06/17/2024     Lab Results   Component Value Date    SODIUM 138 06/26/2024    K 3.9 06/26/2024      "(H) 06/26/2024    CO2 23 06/26/2024    BUN 12 06/26/2024    CREATININE 0.59 (L) 06/26/2024    GLUC 94 06/26/2024    CALCIUM 8.7 06/26/2024     Lab Results   Component Value Date    CALCIUM 8.7 06/26/2024         Stewart Dawson MD  06/26/24    Portions of the record may have been created with voice recognition software.  Occasional wrong word or \"sound alike\" substitutions may have occurred due to the inherent limitations of voice recognition software.  Read the chart carefully and recognize, using context, where substitutions have occurred.  "

## 2024-06-26 NOTE — NURSING NOTE
1300: Patient returned from PACU alert and oriented times 4. Patient reports no pain. Pt has mar cathether, attached by leg strap. Beverage at bedside, call bell within reach. Per pt, no questions or concerns at this time.    1330: Patient continues to report no pain. Discharge instructions reviewed with pt and pt expressed understanding (paper copy provided). Instructions on how to empty mar and standard precautions provided and demonstrated; pt expressed understanding. Pt was able to ambulate to wheelchair without difficulty. Pt brought to ride via wheelchair. Per pt, no questions or concerns at this time. Advised to contact Dr. Dawson' office with any concerns once home

## 2024-06-26 NOTE — TELEPHONE ENCOUNTER
Patient currently admitted. He had DVIU today with Dr Dawson.     Per [facility] secure chat from Dr Dawson:  Should have his catheter out at a nurse visit either Friday or Monday and have cystoscopy set up for about 6 weeks.     Appts scheduled, will confirm with post op call.

## 2024-07-01 ENCOUNTER — TELEPHONE (OUTPATIENT)
Age: 59
End: 2024-07-01

## 2024-07-01 ENCOUNTER — PROCEDURE VISIT (OUTPATIENT)
Dept: UROLOGY | Facility: CLINIC | Age: 59
End: 2024-07-01

## 2024-07-01 VITALS
DIASTOLIC BLOOD PRESSURE: 82 MMHG | BODY MASS INDEX: 22.15 KG/M2 | WEIGHT: 150 LBS | OXYGEN SATURATION: 97 % | SYSTOLIC BLOOD PRESSURE: 120 MMHG | HEART RATE: 59 BPM

## 2024-07-01 DIAGNOSIS — N99.115 POSTPROCEDURAL MALE FOSSA NAVICULARIS URETHRAL STRICTURE: Primary | ICD-10-CM

## 2024-07-01 PROCEDURE — 99024 POSTOP FOLLOW-UP VISIT: CPT

## 2024-07-01 NOTE — PROGRESS NOTES
7/1/2024    Navin Wise is a 58 y.o. male  [id number]    Diagnosis:      Patient presents for mar catheter removal s/p DVIU on 6/26/24 with Dr. Dawson    Plan:  Patient will return as scheduled for cysto in 6 weeks     Procedure:  Mar removal after deflation of an intact balloon. Tolerated well. Advised to contact office with any questions or concerns. Post mar removal expectations discussed. Pt verbalized understanding.     Vitals:    07/01/24 0803   BP: 120/82   BP Location: Left arm   Patient Position: Sitting   Cuff Size: Adult   Pulse: 59   SpO2: 97%   Weight: 68 kg (150 lb)         Latoya Beck RN

## 2024-07-01 NOTE — TELEPHONE ENCOUNTER
Pt calling to request a back to work note. Pt is going back tomorrow morning.     Please call pt once note is placed.  [phone] and printed pt stated he can come and pic it up at [facility].

## 2024-07-01 NOTE — TELEPHONE ENCOUNTER
Pt returning call about letter to  in [place] office. He is on his way.    No further action needed at thist tati

## 2024-07-01 NOTE — TELEPHONE ENCOUNTER
Wrote up the letter as requested and called pt. Had to LVM to say note was ready at the [place] office and here until 4pm. Not open rest of week here so if cannot make it to here please call back.

## 2024-08-15 ENCOUNTER — PROCEDURE VISIT (OUTPATIENT)
Dept: UROLOGY | Facility: CLINIC | Age: 59
End: 2024-08-15
Payer: COMMERCIAL

## 2024-08-15 VITALS
WEIGHT: 149 LBS | SYSTOLIC BLOOD PRESSURE: 90 MMHG | HEIGHT: 69 IN | BODY MASS INDEX: 22.07 KG/M2 | DIASTOLIC BLOOD PRESSURE: 60 MMHG | HEART RATE: 60 BPM | OXYGEN SATURATION: 97 %

## 2024-08-15 DIAGNOSIS — N99.115 POSTPROCEDURAL MALE FOSSA NAVICULARIS URETHRAL STRICTURE: Primary | ICD-10-CM

## 2024-08-15 PROCEDURE — 52000 CYSTOURETHROSCOPY: CPT | Performed by: UROLOGY

## 2024-08-20 ENCOUNTER — TELEPHONE (OUTPATIENT)
Dept: UROLOGY | Facility: CLINIC | Age: 59
End: 2024-08-20

## 2024-08-20 NOTE — TELEPHONE ENCOUNTER
Follow Up appointment has been made, VM message left for patient, call back number to confirm appointment.

## 2024-08-20 NOTE — TELEPHONE ENCOUNTER
----- Message from Stewart Dawson MD sent at 8/20/2024  8:34 AM EDT -----  Make sure he has a 3 month follow up to discuss urethral dilation and urination.

## 2024-08-20 NOTE — PROGRESS NOTES
"   Cystoscopy     Date/Time  8/15/2024 8:30 AM     Performed by  Stewart Dawson MD   Authorized by  Stewart Dawson MD     Universal Protocol:  Consent: Written consent obtained.  Risks and benefits: risks, benefits and alternatives were discussed  Consent given by: patient  Patient identity confirmed: verbally with patient      Procedure Details:  Procedure type: cystoscopy    Patient tolerance: Patient tolerated the procedure well with no immediate complications    Additional Procedure Details: Findings: Fossa is tight again.  Needed blue dilator up to 1\" left outside.  Instructed patient.  Plan: twice weekly self dilation with penis in full extension to avoid urethral injury.      "

## (undated) DEVICE — CATH FOLEY COUNCIL 16FR 5ML 2 WAY LUBRICATH

## (undated) DEVICE — GUIDEWIRE STRGHT TIP 0.038 IN SOLO PLUS

## (undated) DEVICE — GLOVE SRG BIOGEL 7.5

## (undated) DEVICE — CYSTO TUBING TUR Y IRRIGATION

## (undated) DEVICE — POV-IOD SOLUTION 4OZ BT

## (undated) DEVICE — CYSTOSCOPY PACK: Brand: CONVERTORS

## (undated) DEVICE — POUCH URO CATCHER II STERILE

## (undated) DEVICE — RADIOLOGY STERILE LABELS: Brand: CENTURION

## (undated) DEVICE — STANDARD SURGICAL GOWN, L: Brand: CONVERTORS

## (undated) DEVICE — STRAP FOLEY CATH LEG 1545 9

## (undated) DEVICE — LUBRICANT JELLY SURGILUBE TUBE 4OZ FLIP TOP